# Patient Record
Sex: FEMALE | Race: WHITE | NOT HISPANIC OR LATINO | Employment: FULL TIME | URBAN - METROPOLITAN AREA
[De-identification: names, ages, dates, MRNs, and addresses within clinical notes are randomized per-mention and may not be internally consistent; named-entity substitution may affect disease eponyms.]

---

## 2022-01-26 ENCOUNTER — OFFICE VISIT (OUTPATIENT)
Dept: FAMILY MEDICINE | Facility: CLINIC | Age: 37
End: 2022-01-26
Payer: COMMERCIAL

## 2022-01-26 VITALS
OXYGEN SATURATION: 98 % | DIASTOLIC BLOOD PRESSURE: 77 MMHG | BODY MASS INDEX: 20.87 KG/M2 | WEIGHT: 145.8 LBS | HEART RATE: 88 BPM | TEMPERATURE: 98.9 F | SYSTOLIC BLOOD PRESSURE: 114 MMHG | HEIGHT: 70 IN

## 2022-01-26 DIAGNOSIS — J45.40 MODERATE PERSISTENT ASTHMA WITHOUT COMPLICATION: ICD-10-CM

## 2022-01-26 DIAGNOSIS — J30.89 NON-SEASONAL ALLERGIC RHINITIS, UNSPECIFIED TRIGGER: ICD-10-CM

## 2022-01-26 DIAGNOSIS — F33.0 MILD RECURRENT MAJOR DEPRESSION (H): Primary | ICD-10-CM

## 2022-01-26 DIAGNOSIS — F41.1 GAD (GENERALIZED ANXIETY DISORDER): ICD-10-CM

## 2022-01-26 DIAGNOSIS — B00.1 RECURRENT HERPES LABIALIS: ICD-10-CM

## 2022-01-26 PROCEDURE — 99203 OFFICE O/P NEW LOW 30 MIN: CPT | Performed by: FAMILY MEDICINE

## 2022-01-26 RX ORDER — ESCITALOPRAM OXALATE 10 MG/1
10 TABLET ORAL DAILY
Qty: 90 TABLET | Refills: 1 | Status: SHIPPED | OUTPATIENT
Start: 2022-01-26 | End: 2022-07-27

## 2022-01-26 RX ORDER — BUDESONIDE AND FORMOTEROL FUMARATE DIHYDRATE 160; 4.5 UG/1; UG/1
AEROSOL RESPIRATORY (INHALATION)
COMMUNITY
End: 2022-01-26

## 2022-01-26 RX ORDER — BUSPIRONE HYDROCHLORIDE 15 MG/1
15 TABLET ORAL 2 TIMES DAILY
Qty: 180 TABLET | Refills: 1 | Status: SHIPPED | OUTPATIENT
Start: 2022-01-26 | End: 2022-10-25

## 2022-01-26 RX ORDER — ESCITALOPRAM OXALATE 10 MG/1
10 TABLET ORAL DAILY
COMMUNITY
End: 2022-01-26

## 2022-01-26 RX ORDER — ACYCLOVIR 400 MG/1
400 TABLET ORAL 2 TIMES DAILY
Qty: 180 TABLET | Refills: 1 | Status: SHIPPED | OUTPATIENT
Start: 2022-01-26 | End: 2022-01-30

## 2022-01-26 RX ORDER — BUSPIRONE HYDROCHLORIDE 15 MG/1
15 TABLET ORAL 2 TIMES DAILY
COMMUNITY
End: 2022-01-26

## 2022-01-26 RX ORDER — ACYCLOVIR 400 MG/1
400 TABLET ORAL DAILY
COMMUNITY
End: 2022-01-26

## 2022-01-26 RX ORDER — BUDESONIDE AND FORMOTEROL FUMARATE DIHYDRATE 160; 4.5 UG/1; UG/1
AEROSOL RESPIRATORY (INHALATION)
Qty: 18 G | Refills: 1 | Status: SHIPPED | OUTPATIENT
Start: 2022-01-26 | End: 2022-11-14

## 2022-01-26 ASSESSMENT — ENCOUNTER SYMPTOMS
HEARTBURN: 0
CHILLS: 0
BREAST MASS: 0
CONSTIPATION: 0
HEADACHES: 0
FEVER: 0
WEAKNESS: 0
JOINT SWELLING: 0
PALPITATIONS: 0
EYE PAIN: 0
DIZZINESS: 0
MYALGIAS: 0
FREQUENCY: 0
HEMATOCHEZIA: 0
ABDOMINAL PAIN: 0
NERVOUS/ANXIOUS: 0
COUGH: 0
PARESTHESIAS: 0
DIARRHEA: 0
SORE THROAT: 0
NAUSEA: 0
ARTHRALGIAS: 0
HEMATURIA: 0
SHORTNESS OF BREATH: 0
DYSURIA: 0

## 2022-01-26 ASSESSMENT — PATIENT HEALTH QUESTIONNAIRE - PHQ9
5. POOR APPETITE OR OVEREATING: NOT AT ALL
SUM OF ALL RESPONSES TO PHQ QUESTIONS 1-9: 0

## 2022-01-26 ASSESSMENT — ANXIETY QUESTIONNAIRES
2. NOT BEING ABLE TO STOP OR CONTROL WORRYING: NOT AT ALL
IF YOU CHECKED OFF ANY PROBLEMS ON THIS QUESTIONNAIRE, HOW DIFFICULT HAVE THESE PROBLEMS MADE IT FOR YOU TO DO YOUR WORK, TAKE CARE OF THINGS AT HOME, OR GET ALONG WITH OTHER PEOPLE: NOT DIFFICULT AT ALL
3. WORRYING TOO MUCH ABOUT DIFFERENT THINGS: NOT AT ALL
1. FEELING NERVOUS, ANXIOUS, OR ON EDGE: NOT AT ALL
7. FEELING AFRAID AS IF SOMETHING AWFUL MIGHT HAPPEN: NOT AT ALL
5. BEING SO RESTLESS THAT IT IS HARD TO SIT STILL: NOT AT ALL
GAD7 TOTAL SCORE: 0
6. BECOMING EASILY ANNOYED OR IRRITABLE: NOT AT ALL

## 2022-01-26 ASSESSMENT — ASTHMA QUESTIONNAIRES: ACT_TOTALSCORE: 24

## 2022-01-26 ASSESSMENT — MIFFLIN-ST. JEOR: SCORE: 1435.97

## 2022-01-26 NOTE — PROGRESS NOTES
Assessment & Plan       ICD-10-CM    1. Mild recurrent major depression (H)  F33.0 escitalopram (LEXAPRO) 10 MG tablet   2. VEELIN (generalized anxiety disorder)  F41.1 busPIRone (BUSPAR) 15 MG tablet     escitalopram (LEXAPRO) 10 MG tablet   3. Recurrent herpes labialis  B00.1 acyclovir (ZOVIRAX) 400 MG tablet   4. Moderate persistent asthma without complication  J45.40 albuterol (PROAIR RESPICLICK) 108 (90 Base) MCG/ACT inhaler     budesonide-formoterol (SYMBICORT) 160-4.5 MCG/ACT Inhaler   5. Non-seasonal allergic rhinitis, unspecified trigger  J30.89      We discussed the above items  We will continue her same baseline meds  I advised a follow-up visit in about 6 months with an annual female exam including Pap smear    Return in about 6 months (around 7/26/2022) for Physical Exam, Routine Visit, follow up on depression.    Rolando Palacios MD  [facility] MARK Haney is a 36 year old who presents for the following health issues     Healthy Habits:     Getting at least 3 servings of Calcium per day:  Yes    Bi-annual eye exam:  Yes    Dental care twice a year:  Yes    Sleep apnea or symptoms of sleep apnea:  None    Diet:  Regular (no restrictions)    Frequency of exercise:  4-5 days/week    Duration of exercise:  30-45 minutes    Taking medications regularly:  Yes    Medication side effects:  None    PHQ-2 Total Score: 0    Additional concerns today:  No       Patient is here today to establish care.  Needs refills for her depression and anxiety, and asthma medications.    She just had a physical with her PCP in [place] in October, so we elected not to do a complete physical today.  Review of her electronic health record via [facility] shows that she last had a Pap smear on July 6 of 2017.  It was normal and HPV was negative, so I believe a repeat Pap was recommended in 5 years.  She is on medications as below.  She has been taking buspirone for years for anxiety.  She has  been on Lexapro for a year or so for depression and that medicine has been quite helpful.  She feels like her anxiety and depression are well controlled on these meds.  She takes Symbicort once or twice daily for asthma.  She does not need to use her albuterol inhaler much given her use of Symbicort.  She used to get recurrent cold sores that were quite bothersome, but she takes acyclovir 400 mg twice a day and that helps to prevent them.    She also has allergies and has been using cetirizine and Flonase nasal spray for them.    She generally feels well.  We asked her standard questions on anxiety and depression and she scored a 0 on those questionnaires.  She scored a 24 on her asthma control test.    Patient Active Problem List   Diagnosis     Mild recurrent major depression (H)     EVELIN (generalized anxiety disorder)     Recurrent herpes labialis     Moderate persistent asthma without complication     Non-seasonal allergic rhinitis, unspecified trigger     Current Outpatient Medications   Medication     acyclovir (ZOVIRAX) 400 MG tablet     albuterol (PROAIR RESPICLICK) 108 (90 Base) MCG/ACT inhaler     budesonide-formoterol (SYMBICORT) 160-4.5 MCG/ACT Inhaler     busPIRone (BUSPAR) 15 MG tablet     escitalopram (LEXAPRO) 10 MG tablet     No current facility-administered medications for this visit.         Review of Systems   Constitutional: Negative for chills and fever.   HENT: Positive for congestion. Negative for ear pain, hearing loss and sore throat.    Eyes: Negative for pain and visual disturbance.   Respiratory: Negative for cough and shortness of breath.    Cardiovascular: Negative for chest pain, palpitations and peripheral edema.   Gastrointestinal: Negative for abdominal pain, constipation, diarrhea, heartburn, hematochezia and nausea.   Breasts:  Negative for tenderness, breast mass and discharge.   Genitourinary: Negative for dysuria, frequency, genital sores, hematuria, pelvic pain, urgency,  "vaginal bleeding and vaginal discharge.   Musculoskeletal: Negative for arthralgias, joint swelling and myalgias.   Skin: Negative for rash.   Neurological: Negative for dizziness, weakness, headaches and paresthesias.   Psychiatric/Behavioral: Negative for mood changes. The patient is not nervous/anxious.             Objective    /77 (BP Location: Right arm, Patient Position: Sitting, Cuff Size: Adult Regular)   Pulse 88   Temp 98.9  F (37.2  C) (Oral)   Ht 1.785 m (5' 10.28\")   Wt 66.1 kg (145 lb 12.8 oz)   LMP 01/04/2022   SpO2 98%   Breastfeeding No   BMI 20.76 kg/m    Body mass index is 20.76 kg/m .  Physical Exam   GENERAL: healthy, alert and no distress  EYES: Eyes grossly normal to inspection, PERRL and conjunctivae and sclerae normal  RESP: lungs clear to auscultation - no rales, rhonchi or wheezes  CV: regular rate and rhythm, normal S1 S2, no S3 or S4, no murmur, click or rub, no peripheral edema and peripheral pulses strong  MS: no gross musculoskeletal defects noted, no edema  PSYCH: mentation appears normal, affect normal/bright.  She scored a 0 on both her PHQ-9 and EVELIN-7 as noted above.    Old records were reviewed via [facility].            "

## 2022-01-27 ASSESSMENT — ANXIETY QUESTIONNAIRES: GAD7 TOTAL SCORE: 0

## 2022-01-28 ENCOUNTER — TELEPHONE (OUTPATIENT)
Dept: FAMILY MEDICINE | Facility: CLINIC | Age: 37
End: 2022-01-28
Payer: COMMERCIAL

## 2022-01-28 DIAGNOSIS — B00.1 RECURRENT HERPES LABIALIS: ICD-10-CM

## 2022-01-30 RX ORDER — ACYCLOVIR 400 MG/1
400 TABLET ORAL 2 TIMES DAILY
Qty: 180 TABLET | Refills: 1 | Status: SHIPPED | OUTPATIENT
Start: 2022-01-30 | End: 2022-10-25

## 2022-01-30 NOTE — TELEPHONE ENCOUNTER
"Not sure how that got into the \"sig\". I sent a new Rx without it in there.    Rolando Palacios MD    "

## 2022-02-06 ENCOUNTER — HEALTH MAINTENANCE LETTER (OUTPATIENT)
Age: 37
End: 2022-02-06

## 2022-03-03 ENCOUNTER — OFFICE VISIT (OUTPATIENT)
Dept: URGENT CARE | Facility: URGENT CARE | Age: 37
End: 2022-03-03
Payer: COMMERCIAL

## 2022-03-03 ENCOUNTER — ANCILLARY PROCEDURE (OUTPATIENT)
Dept: GENERAL RADIOLOGY | Facility: CLINIC | Age: 37
End: 2022-03-03
Attending: NURSE PRACTITIONER
Payer: COMMERCIAL

## 2022-03-03 VITALS
HEIGHT: 71 IN | OXYGEN SATURATION: 99 % | DIASTOLIC BLOOD PRESSURE: 81 MMHG | SYSTOLIC BLOOD PRESSURE: 133 MMHG | TEMPERATURE: 97.9 F | BODY MASS INDEX: 20.66 KG/M2 | WEIGHT: 147.6 LBS | HEART RATE: 61 BPM

## 2022-03-03 DIAGNOSIS — S99.911A INJURY OF RIGHT ANKLE, INITIAL ENCOUNTER: ICD-10-CM

## 2022-03-03 DIAGNOSIS — S93.401A SPRAIN OF RIGHT ANKLE, UNSPECIFIED LIGAMENT, INITIAL ENCOUNTER: Primary | ICD-10-CM

## 2022-03-03 PROCEDURE — 99213 OFFICE O/P EST LOW 20 MIN: CPT | Performed by: NURSE PRACTITIONER

## 2022-03-03 PROCEDURE — 73610 X-RAY EXAM OF ANKLE: CPT | Mod: RT | Performed by: RADIOLOGY

## 2022-03-03 NOTE — PATIENT INSTRUCTIONS
Patient Education     Ankle Sprain (Adult)    An ankle sprain is a stretching or tearing of the ligaments that hold the ankle joint together. There are no broken bones.  An ankle sprain is a common injury for both children and adults. It happens when the ankle turns, twists, or rolls in an awkward way. This can be caused by a sports injury. Or it can happen from doing something as simple as stepping on an uneven surface.  Ligaments are made of tough connective tissue. Normally, ligaments stretch a certain amount and then go back to their normal place. A sprain happens when a ligament is forced to stretch more than the normal amount. A severe sprain can actually tear the ligaments. If you have a severe sprain, you may have felt or heard something like a pop when you were injured.  Most sprains take about 4 to 6 weeks to heal. A severe sprain can take several months to recover.  Your healthcare provider may order X-rays to be sure you don t have a fracture, or broken bone.  The injured area will feel sore. Swelling and pain may make it hard to walk. You may need crutches if walking is painful. Or your provider may have you use a cast boot or air splint. This will depend on the grade of ankle sprain that you have.  Home care    For a Grade 1 sprain, use RICE (rest, ice, compression, and elevation):    Rest your ankle. Don t walk on it.    Ice should be used right away to help control swelling. Place an ice pack over the injured area for 20 minutes. Do this every 3 to 6 hours for the first 24 to 48 hours. Keep using ice packs to ease pain and swelling as needed. To make an ice pack, put ice cubes in a plastic bag that seals at the top. Wrap the bag in a clean, thin towel or cloth. Never put ice or an ice pack directly on the skin. The ice pack can be put right on the cast, bandage, or splint. As the ice melts, be careful that the cast, bandage, or splint doesn t get wet. If you have a boot, open it to apply an ice  pack, unless told otherwise by your provider.    Compression devices help to control swelling. They also keep the ankle from moving and support your injured ankle. These devices include dressings, bandages, and wraps.    Elevate or raise your ankle above the level of your heart when sitting or lying down. This is very important for the first 48 hours.    Follow the RICE guidelines for a Grade 2 sprain. This type of sprain will take longer to heal. Your provider may have you wear a splint, cast, or brace to keep your ankle from moving.     If you have a Grade 3 sprain, you are at risk for long-term ankle instability. In rare cases, surgery may be needed. Your provider may have you wear a short leg cast or a walking boot for 2 to 3 weeks.    After 48 hours, it may be helpful to apply heat for 20 minutes several times a day. You can do this with a heating pad or warm compress. Or you may want to go back and forth between using ice and heat. Never apply heat directly to the skin. Always wrap the heating pad or warm compress in a clean, thin towel or cloth.    You may use over-the-counter pain medicine (NSAIDS or nonsteroidal anti-inflammatory drugs) to control pain, unless another pain medicine was prescribed. Talk with your provider before using these medicines if you have chronic liver or kidney disease, or have ever had a stomach ulcer or gastrointestinal bleeding.    Follow any rehabilitation exercises your provider gives you. These can help you be more flexible and improve your balance and coordination. This is helpful in preventing long-term ankle problems.  Prevention  To help prevent ankle sprains, it s important to have good strength, balance, and flexibility. Be sure to:    Always warm up before you exercise or do something very active    Be careful when walking or running on uneven or cracked surfaces    Wear shoes that are in good condition and fit well    Listen to your body s signals to slow down when you  are in pain or tired  Follow-up care  Any X-rays you had today don t show any broken bones, breaks, or fractures. Sometimes fractures don t show up on the first X-ray. Bruises and sprains can sometimes hurt as much as a fracture. These injuries can take time to heal completely. If your symptoms don t get better or they get worse, talk with your healthcare provider. You may need a repeat X-ray.  Follow up with your healthcare provider, or as advised. Check for any warning signs listed below.  When to seek medical advice  Call your healthcare provider right away if any of these occur:    Fever of 100.4 F (38 C) or higher, or as directed by your healthcare provider    Chills    The injury doesn t seem to be healing    The swelling comes back    The cast or splint has a bad smell    The plaster cast or splint gets wet or soft    The fiberglass cast or splint gets wet and does not dry for 24 hours    The pain or swelling increases, or redness appears    Your toes become cold, blue, numb, or tingly    The skin is discolored (looks blue, purple, or gray), has blisters, or is irritated    You re-injure your ankle  Keith last reviewed this educational content on 5/1/2018 2000-2021 [employer]. All rights reserved. This information is not intended as a substitute for professional medical care. Always follow your healthcare professional's instructions.

## 2022-03-03 NOTE — PROGRESS NOTES
Assessment & Plan     Sprain of right ankle, unspecified ligament, initial encounter    Injury of right ankle, initial encounter    - XR Ankle Right G/E 3 Views       Reviewed xray images and results during visit showing no obvious fracture or dislocation of right ankle. She declines tailbone imaging at this time. Discussed likely lateral ankle sprain and recommended RICE: rest, ice, compress, elevate. Ibuprofen 600 mg every 8 hours with food for the next 7-10 days recommended, tylenol as needed. Patient declines crutches and is fitted with ACE wrap. Gentle ROM once swelling decreases recommended. Discussed gradual healing expected over the next couple weeks and full healing in about 12 weeks.     Follow-up with PCP if symptoms persist for 7 days, and sooner if symptoms worsen or new symptoms develop.     Discussed red flag symptoms which warrant immediate visit in emergency room    All questions were answered and patient verbalized understanding. AVS reviewed with patient.     Shaniqua Mata, DNP, APRN, CNP 3/3/2022 12:22 PM  [facility] 36 [facility] who presents to clinic today for the following health issues:  Chief Complaint   Patient presents with     Musculoskeletal Problem     Ankle pain/bruise from fall last week.     Tailbone Pain     MS Injury/Pain    Onset of symptoms was 1 week(s) ago.  Location: right ankle and tailbone  Context:  The injury happened while falling landing on tailbone and rolled right ankle  Course of symptoms is worsening, moving into right lower leg.    Severity mild  Current and Associated symptoms: Pain, Swelling and Bruising  Denies  Warmth, Redness and Decreased range of motion  Aggravating Factors: weight-bearing at the end of the day has pain  Therapies to improve symptoms include: 400 mg ibuprofen at bedtime has been helping tempoarily  This is not the first time this type of problem has occurred for  "this patient, she has fractured and sprained her ankles.   Denies: fecal incontinence, urinary incontinence, lower extremity numbness, lower extremity weakness and paresthesia    She did not hit her head or lose consciousness    Problem list, Medication list, Allergies, and Medical history reviewed in EPIC.    ROS:  Review of systems negative except for noted above        Objective    /81 (BP Location: Left arm, Patient Position: Sitting, Cuff Size: Adult Regular)   Pulse 61   Temp 97.9  F (36.6  C) (Tympanic)   Ht 1.803 m (5' 11\")   Wt 67 kg (147 lb 9.6 oz)   SpO2 99%   BMI 20.59 kg/m    Physical Exam  Constitutional:       General: She is not in acute distress.     Appearance: She is not toxic-appearing or diaphoretic.   Cardiovascular:      Pulses: Normal pulses.   Musculoskeletal:      Right knee: Normal.      Right lower leg: Normal.      Right ankle: Swelling present. Tenderness present over the lateral malleolus. Normal range of motion.      Right foot: Normal.      Comments: No spinal or paraspinal tenderness with palpation, Full lumbar ROM. Mild right ankle swelling   Skin:     General: Skin is warm and dry.      Findings: Bruising present. No erythema.      Comments: Bruising right foot and lateral malleolus   Neurological:      Mental Status: She is alert.      Sensory: No sensory deficit.      Gait: Gait normal.      Comments: Able to walk on toes, heels, straight line walk          X-ray right ankle was performed and reviewed independently by myself showing no obvious fracture or dislocation  Radiologist impression:   Results for orders placed or performed in visit on 03/03/22   XR Ankle Right G/E 3 Views     Status: None    Narrative    EXAM: ANKLE RIGHT THREE OR MORE VIEWS   DATE/TIME: 3/3/2022 11:53 AM     INDICATION: Right ankle injury and pain.  COMPARISON: None.      Impression    IMPRESSION: Normal joint spacing and alignment. No fracture.    CONCEPCIÓN MARMOLEJO MD         SYSTEM ID:  " [id number]

## 2022-07-18 ASSESSMENT — PATIENT HEALTH QUESTIONNAIRE - PHQ9
10. IF YOU CHECKED OFF ANY PROBLEMS, HOW DIFFICULT HAVE THESE PROBLEMS MADE IT FOR YOU TO DO YOUR WORK, TAKE CARE OF THINGS AT HOME, OR GET ALONG WITH OTHER PEOPLE: NOT DIFFICULT AT ALL
SUM OF ALL RESPONSES TO PHQ QUESTIONS 1-9: 3
SUM OF ALL RESPONSES TO PHQ QUESTIONS 1-9: 3

## 2022-07-25 ENCOUNTER — OFFICE VISIT (OUTPATIENT)
Dept: FAMILY MEDICINE | Facility: CLINIC | Age: 37
End: 2022-07-25
Payer: COMMERCIAL

## 2022-07-25 VITALS
DIASTOLIC BLOOD PRESSURE: 68 MMHG | WEIGHT: 154.2 LBS | TEMPERATURE: 98.7 F | SYSTOLIC BLOOD PRESSURE: 130 MMHG | HEIGHT: 71 IN | OXYGEN SATURATION: 98 % | HEART RATE: 66 BPM | RESPIRATION RATE: 16 BRPM | BODY MASS INDEX: 21.59 KG/M2

## 2022-07-25 DIAGNOSIS — M23.8X2: Primary | ICD-10-CM

## 2022-07-25 DIAGNOSIS — J45.40 MODERATE PERSISTENT ASTHMA WITHOUT COMPLICATION: ICD-10-CM

## 2022-07-25 PROCEDURE — 99213 OFFICE O/P EST LOW 20 MIN: CPT | Performed by: FAMILY MEDICINE

## 2022-07-25 ASSESSMENT — PAIN SCALES - GENERAL: PAINLEVEL: NO PAIN (0)

## 2022-07-25 ASSESSMENT — PATIENT HEALTH QUESTIONNAIRE - PHQ9
SUM OF ALL RESPONSES TO PHQ QUESTIONS 1-9: 3
10. IF YOU CHECKED OFF ANY PROBLEMS, HOW DIFFICULT HAVE THESE PROBLEMS MADE IT FOR YOU TO DO YOUR WORK, TAKE CARE OF THINGS AT HOME, OR GET ALONG WITH OTHER PEOPLE: NOT DIFFICULT AT ALL

## 2022-07-25 ASSESSMENT — ASTHMA QUESTIONNAIRES: ACT_TOTALSCORE: 24

## 2022-07-25 NOTE — PROGRESS NOTES
Assessment & Plan     Intraligamentous cyst of knee, left   Appears to be cyst of ligament around the knee; since starting to bother her discussed evaluation by orthopedics  - Orthopedic  Referral; Future    Moderate persistent asthma without complication   ACT score good      No follow-ups on file.    Brooks Calderon MD  [facility] MARK Haney is a 36 year old, presenting for the following health issues:  Check Lump   Lump inner left knee x several years  Lately starting to bother her with exercise or even walking and appears to have gotten bigger;    History of Present Illness       Reason for visit:  Pain on left knew with lump that swells and grows with activity. Had the pain for several years but the lump size and extra pain is fairly new within the last several months.    She eats 4 or more servings of fruits and vegetables daily.She consumes 0 sweetened beverage(s) daily.She exercises with enough effort to increase her heart rate 30 to 60 minutes per day.  She exercises with enough effort to increase her heart rate 6 days per week.   She is taking medications regularly.    Today's PHQ-9         PHQ-9 Total Score: 3    PHQ-9 Q9 Thoughts of better off dead/self-harm past 2 weeks :   Not at all    How difficult have these problems made it for you to do your work, take care of things at home, or get along with other people: Not difficult at all     Asthma    Well controlled  ACT Total Scores 1/26/2022 7/25/2022   ACT TOTAL SCORE (Goal Greater than or Equal to 20) 24 24   In the past 12 months, how many times did you visit the emergency room for your asthma without being admitted to the hospital? 0 0   In the past 12 months, how many times were you hospitalized overnight because of your asthma? 0 0     Review of Systems   Constitutional, HEENT, cardiovascular, pulmonary, gi and gu systems are negative, except as otherwise noted.      Objective    /68 (BP  "Location: Left arm)   Pulse 66   Temp 98.7  F (37.1  C) (Oral)   Resp 16   Ht 1.81 m (5' 11.26\")   Wt 69.9 kg (154 lb 3.2 oz)   LMP 07/14/2022 (Approximate)   SpO2 98%   BMI 21.35 kg/m    Body mass index is 21.35 kg/m .  Physical Exam   GENERAL: healthy, alert and no distress  RESP: lungs clear to auscultation - no rales, rhonchi or wheezes  CV: regular rate and rhythm, no murmur, click or rub, no peripheral edema   MS: Lt Knee         Non tender cystic lesion below joint line     "

## 2022-07-26 DIAGNOSIS — F41.1 GAD (GENERALIZED ANXIETY DISORDER): ICD-10-CM

## 2022-07-26 DIAGNOSIS — F33.0 MILD RECURRENT MAJOR DEPRESSION (H): ICD-10-CM

## 2022-07-27 RX ORDER — ESCITALOPRAM OXALATE 10 MG/1
10 TABLET ORAL DAILY
Qty: 90 TABLET | Refills: 1 | Status: SHIPPED | OUTPATIENT
Start: 2022-07-27 | End: 2023-01-20

## 2022-07-27 NOTE — TELEPHONE ENCOUNTER
Prescription approved per [facility] Refill Protocol.  Juliette Denny RN    PHQ-9 score:    PHQ 7/26/2022   PHQ-9 Total Score 0   Q9: Thoughts of better off dead/self-harm past 2 weeks Not at all

## 2022-08-03 ENCOUNTER — OFFICE VISIT (OUTPATIENT)
Dept: ORTHOPEDICS | Facility: CLINIC | Age: 37
End: 2022-08-03

## 2022-08-03 ENCOUNTER — ANCILLARY PROCEDURE (OUTPATIENT)
Dept: GENERAL RADIOLOGY | Facility: CLINIC | Age: 37
End: 2022-08-03
Attending: ORTHOPAEDIC SURGERY
Payer: COMMERCIAL

## 2022-08-03 VITALS
SYSTOLIC BLOOD PRESSURE: 116 MMHG | HEIGHT: 71 IN | HEART RATE: 76 BPM | BODY MASS INDEX: 21.14 KG/M2 | WEIGHT: 151 LBS | DIASTOLIC BLOOD PRESSURE: 74 MMHG

## 2022-08-03 DIAGNOSIS — M25.562 CHRONIC PAIN OF LEFT KNEE: ICD-10-CM

## 2022-08-03 DIAGNOSIS — G89.29 CHRONIC PAIN OF LEFT KNEE: ICD-10-CM

## 2022-08-03 DIAGNOSIS — M23.007 PERIMENISCAL CYST OF LEFT KNEE: Primary | ICD-10-CM

## 2022-08-03 PROCEDURE — 20610 DRAIN/INJ JOINT/BURSA W/O US: CPT | Mod: LT | Performed by: ORTHOPAEDIC SURGERY

## 2022-08-03 PROCEDURE — 99244 OFF/OP CNSLTJ NEW/EST MOD 40: CPT | Mod: 25 | Performed by: ORTHOPAEDIC SURGERY

## 2022-08-03 PROCEDURE — 73562 X-RAY EXAM OF KNEE 3: CPT | Mod: TC | Performed by: RADIOLOGY

## 2022-08-03 RX ORDER — TRIAMCINOLONE ACETONIDE 40 MG/ML
40 INJECTION, SUSPENSION INTRA-ARTICULAR; INTRAMUSCULAR
Status: SHIPPED | OUTPATIENT
Start: 2022-08-03

## 2022-08-03 RX ORDER — BUPIVACAINE HYDROCHLORIDE 2.5 MG/ML
3 INJECTION, SOLUTION EPIDURAL; INFILTRATION; INTRACAUDAL
Status: SHIPPED | OUTPATIENT
Start: 2022-08-03

## 2022-08-03 RX ADMIN — TRIAMCINOLONE ACETONIDE 40 MG: 40 INJECTION, SUSPENSION INTRA-ARTICULAR; INTRAMUSCULAR at 10:36

## 2022-08-03 RX ADMIN — BUPIVACAINE HYDROCHLORIDE 3 ML: 2.5 INJECTION, SOLUTION EPIDURAL; INFILTRATION; INTRACAUDAL at 10:36

## 2022-08-03 ASSESSMENT — PAIN SCALES - GENERAL: PAINLEVEL: NO PAIN (1)

## 2022-08-03 NOTE — PROGRESS NOTES
"CHIEF COMPLAINT:   Chief Complaint   Patient presents with     Left Knee - Pain     Onset: about 6 months. NKI. Patient notes she has always had knee pain since early 20's. She noticed a lump about 6 months ago when she was comparing sides of the knee. Lump has gotten bigger. Pain with pressure, running, walking, jumping. She is very active. Hx of ballet when she was in high school to mid 20's. Pain comes and goes. No tx.    .  Medina Carvajal is seen today in the [facility] for evaluation of left knee mass at the request of Dr. Brooks Calderon         HISTORY OF PRESENT ILLNESS    Medina Carvajal is a 36 year old female seen for evaluation of ongoing left knee pain with a mass with no known injury.   Pain, mass has been present for 6 months. She reports always having knee pain since her early 20's. She noticed the lump about 6 months ago when she was comparing her knees. She feels the lump has increased in size since first noticed. Pain with pressure to the area, running, walking, jumping. She is very active so its bothering her. Pain comes and goes. Lump along the inner aspect, pain in area but also on top of the knee cap. Has decreased activities recently so pain has improved.    She states the knee feels \"bigger\" when there is pain.    Treatment with stopping running or walking long distances. Occasional ibuprofen.    History of ballet in high school and in her 20's.    Present symptoms: pain medially  and anteriorly, pain sharp, dull/achy , mild pain, no catching/popping, no locking, no giving way.    Pain severity: 1/10  Frequency of symptoms: episodic  Exacerbating Factors: running, jumping, prolonged walking  Relieving Factors: rest, sitting  Night Pain: occasional if knees touch each other.      Other PMH:  has no past medical history on file.  Patient Active Problem List   Diagnosis     Mild recurrent major depression (H)     EVELIN (generalized anxiety disorder)     " "Recurrent herpes labialis     Moderate persistent asthma without complication     Non-seasonal allergic rhinitis, unspecified trigger       Surgical Hx:  has no past surgical history on file.    Medications:   Current Outpatient Medications:      acyclovir (ZOVIRAX) 400 MG tablet, Take 1 tablet (400 mg) by mouth 2 times daily, Disp: 180 tablet, Rfl: 1     albuterol (PROAIR RESPICLICK) 108 (90 Base) MCG/ACT inhaler, Inhale 1-2 puffs into the lungs every 6 hours as needed for shortness of breath / dyspnea or wheezing As PRN, Disp: 1 each, Rfl: 2     budesonide-formoterol (SYMBICORT) 160-4.5 MCG/ACT Inhaler, Inhale 1-2 puff(s) twice a day by inhalation route., Disp: 18 g, Rfl: 1     busPIRone (BUSPAR) 15 MG tablet, Take 1 tablet (15 mg) by mouth 2 times daily, Disp: 180 tablet, Rfl: 1     escitalopram (LEXAPRO) 10 MG tablet, TAKE 1 TABLET (10 MG) BY MOUTH DAILY., Disp: 90 tablet, Rfl: 1    Allergies: No Known Allergies    Social Hx: .   reports that she has never smoked. She has never used smokeless tobacco. She reports previous alcohol use. She reports that she does not use drugs.    Family Hx: family history is not on file.    REVIEW OF SYSTEMS: 10 point ROS neg other than the symptoms noted above in the HPI and PMH. Notables include  CONSTITUTIONAL:NEGATIVE for fever, chills, change in weight  INTEGUMENTARY/SKIN: NEGATIVE for worrisome rashes, moles or lesions  MUSCULOSKELETAL:See HPI above  NEURO: NEGATIVE for weakness, dizziness or paresthesias    PHYSICAL EXAM:  /74   Pulse 76   Ht 1.81 m (5' 11.26\")   Wt 68.5 kg (151 lb)   LMP 07/14/2022 (Approximate)   BMI 20.91 kg/m     GENERAL APPEARANCE: healthy, alert, no distress  SKIN: no suspicious lesions or rashes  NEURO: Normal strength and tone, mentation intact and speech normal  PSYCH:  mentation appears normal and affect normal, not anxious  RESPIRATORY: No increased work of breathing.  HANDS: no clubbing, nail pitting  LYMPH: no " palpable popliteal lymphadenopathy.    BILATERAL LOWER EXTREMITIES:  Gait: normal  Alignment: neutral  No gross deformities or masses.  No Quad atrophy, strength normal.  Intact sensation deep peroneal nerve, superficial peroneal nerve, med/lat tibial nerve, sural nerve, saphenous nerve  Intact EHL, EDL, TA, FHL, GS, quadriceps hamstrings and hip flexors  Toes warm and well perfused, brisk capillary refill. Palpable 2+ dp pulses.  Bilateral calf soft and nttp or squeeze.  DTRs: achilles 2+, patella 2+.  Edema: none    LEFT KNEE EXAM:    Skin: intact, no ecchymosis or erythema  ~1-1.5cm soft tissue mass medial knee, just inferior to the medial joint line but proximal to the pes insertion. This is slight tender to palpation, firm.  mass transilluminates with light.    Squat: 100 %, not limited by pain.     ROM: full extension to 140 flexion, some anterior discomfort with flexion.  Tight hamstrings on straight leg raise.  Effusion: none  Tender: medial joint line, mass  NTTP lat joint line, anterior or posterior knee  McMurrays: equivocal medially for pain    MCL: stable, and non-painful at both 0 and 30 degrees knee flexion  Varus stress: stable, and non-painful at both 0 and 30 degrees knee flexion  Lachmans: neg, firm endpoint  Posterior Drawer stable  Patellofemoral joint:                Apprehension: negative              Crepitations: minimal   Grind: positive.    RIGHT KNEE EXAM:    Skin: intact, no ecchymosis or erythema  Squat: 100 %, not limited by pain.     ROM: full extension to 140 flexion  Tight hamstrings on straight leg raise.  Effusion: none  Tender: NTTP med/lat joint line, anterior or posterior knee  McMurrays: negative    MCL: stable, and non-painful at both 0 and 30 degrees knee flexion  Varus stress: stable, and non-painful at both 0 and 30 degrees knee flexion  Lachmans: neg, firm endpoint  Posterior Drawer stable  Patellofemoral joint:                Apprehension:  "negative              Crepitations: minimal    X-RAY:  3 views left knee from 8/3/2022 were reviewed in clinic today. On my review, no obvious fractures or dislocations. Preserved joint spaces.         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 See procedure note below. Appears the cyst decompressed with aspiration attempt although no formal fluid was aspirated.  * rest ice/heat, over the counter pain control, activity modification, compression   * return to clinic as needed.  * consider MRI in future as needed.      * All questions were addressed and answered prior to discharge from clinic today. The patient acknowledges an understanding of and agreement with the plan set forth during today's visit. Patient was advised to call our office or MyChart us if any further questions arise upon leaving our office today.      Usman Briceno M.D., M.S.  Dept. of Orthopaedic Surgery  [facility]    Large Joint Injection/Arthocentesis    Date/Time: 8/3/2022 10:36 AM  Performed by: Usman Briceno MD  Authorized by: Usman Briceno MD     Indications:  Pain  Needle Size:  22 G  Guidance: landmark guided    Approach:  Anteromedial  Location:  Knee   Location comment:  Left knee - Medial knee cyst      Medications:  40 mg triamcinolone 40 MG/ML; 3 mL bupivacaine HCl (PF) " 0.25 %  Aspirate amount (mL):  0  Outcome:  Tolerated well, no immediate complications  Procedure discussed: discussed risks, benefits, and alternatives    Consent Given by:  Patient  Timeout: timeout called immediately prior to procedure    Prep: patient was prepped and draped in usual sterile fashion

## 2022-08-03 NOTE — LETTER
"    8/3/2022         RE: Medina Carvajal  [address]  [place] [address]        Dear Colleague,    Thank you for referring your patient, Medina Carvajal, to the [facility]. Please see a copy of my visit note below.    CHIEF COMPLAINT:   Chief Complaint   Patient presents with     Left Knee - Pain     Onset: about 6 months. NKI. Patient notes she has always had knee pain since early 20's. She noticed a lump about 6 months ago when she was comparing sides of the knee. Lump has gotten bigger. Pain with pressure, running, walking, jumping. She is very active. Hx of ballet when she was in high school to mid 20's. Pain comes and goes. No tx.    .  Medina Carvajal is seen today in the [facility] for evaluation of left knee mass at the request of Dr. Brooks Calderon         HISTORY OF PRESENT ILLNESS    Medina Carvajal is a 36 year old female seen for evaluation of ongoing left knee pain with a mass with no known injury.   Pain, mass has been present for 6 months. She reports always having knee pain since her early 20's. She noticed the lump about 6 months ago when she was comparing her knees. She feels the lump has increased in size since first noticed. Pain with pressure to the area, running, walking, jumping. She is very active so its bothering her. Pain comes and goes. Lump along the inner aspect, pain in area but also on top of the knee cap. Has decreased activities recently so pain has improved.    She states the knee feels \"bigger\" when there is pain.    Treatment with stopping running or walking long distances. Occasional ibuprofen.    History of ballet in high school and in her 20's.    Present symptoms: pain medially  and anteriorly, pain sharp, dull/achy , mild pain, no catching/popping, no locking, no giving way.    Pain severity: 1/10  Frequency of symptoms: episodic  Exacerbating Factors: running, jumping, prolonged " "walking  Relieving Factors: rest, sitting  Night Pain: occasional if knees touch each other.      Other PMH:  has no past medical history on file.  Patient Active Problem List   Diagnosis     Mild recurrent major depression (H)     EVELIN (generalized anxiety disorder)     Recurrent herpes labialis     Moderate persistent asthma without complication     Non-seasonal allergic rhinitis, unspecified trigger       Surgical Hx:  has no past surgical history on file.    Medications:   Current Outpatient Medications:      acyclovir (ZOVIRAX) 400 MG tablet, Take 1 tablet (400 mg) by mouth 2 times daily, Disp: 180 tablet, Rfl: 1     albuterol (PROAIR RESPICLICK) 108 (90 Base) MCG/ACT inhaler, Inhale 1-2 puffs into the lungs every 6 hours as needed for shortness of breath / dyspnea or wheezing As PRN, Disp: 1 each, Rfl: 2     budesonide-formoterol (SYMBICORT) 160-4.5 MCG/ACT Inhaler, Inhale 1-2 puff(s) twice a day by inhalation route., Disp: 18 g, Rfl: 1     busPIRone (BUSPAR) 15 MG tablet, Take 1 tablet (15 mg) by mouth 2 times daily, Disp: 180 tablet, Rfl: 1     escitalopram (LEXAPRO) 10 MG tablet, TAKE 1 TABLET (10 MG) BY MOUTH DAILY., Disp: 90 tablet, Rfl: 1    Allergies: No Known Allergies    Social Hx: .   reports that she has never smoked. She has never used smokeless tobacco. She reports previous alcohol use. She reports that she does not use drugs.    Family Hx: family history is not on file.    REVIEW OF SYSTEMS: 10 point ROS neg other than the symptoms noted above in the HPI and PMH. Notables include  CONSTITUTIONAL:NEGATIVE for fever, chills, change in weight  INTEGUMENTARY/SKIN: NEGATIVE for worrisome rashes, moles or lesions  MUSCULOSKELETAL:See HPI above  NEURO: NEGATIVE for weakness, dizziness or paresthesias    PHYSICAL EXAM:  /74   Pulse 76   Ht 1.81 m (5' 11.26\")   Wt 68.5 kg (151 lb)   LMP 07/14/2022 (Approximate)   BMI 20.91 kg/m     GENERAL APPEARANCE: healthy, alert, no " distress  SKIN: no suspicious lesions or rashes  NEURO: Normal strength and tone, mentation intact and speech normal  PSYCH:  mentation appears normal and affect normal, not anxious  RESPIRATORY: No increased work of breathing.  HANDS: no clubbing, nail pitting  LYMPH: no palpable popliteal lymphadenopathy.    BILATERAL LOWER EXTREMITIES:  Gait: normal  Alignment: neutral  No gross deformities or masses.  No Quad atrophy, strength normal.  Intact sensation deep peroneal nerve, superficial peroneal nerve, med/lat tibial nerve, sural nerve, saphenous nerve  Intact EHL, EDL, TA, FHL, GS, quadriceps hamstrings and hip flexors  Toes warm and well perfused, brisk capillary refill. Palpable 2+ dp pulses.  Bilateral calf soft and nttp or squeeze.  DTRs: achilles 2+, patella 2+.  Edema: none    LEFT KNEE EXAM:    Skin: intact, no ecchymosis or erythema  ~1-1.5cm soft tissue mass medial knee, just inferior to the medial joint line but proximal to the pes insertion. This is slight tender to palpation, firm.  mass transilluminates with light.    Squat: 100 %, not limited by pain.     ROM: full extension to 140 flexion, some anterior discomfort with flexion.  Tight hamstrings on straight leg raise.  Effusion: none  Tender: medial joint line, mass  NTTP lat joint line, anterior or posterior knee  McMurrays: equivocal medially for pain    MCL: stable, and non-painful at both 0 and 30 degrees knee flexion  Varus stress: stable, and non-painful at both 0 and 30 degrees knee flexion  Lachmans: neg, firm endpoint  Posterior Drawer stable  Patellofemoral joint:                Apprehension: negative              Crepitations: minimal   Grind: positive.    RIGHT KNEE EXAM:    Skin: intact, no ecchymosis or erythema  Squat: 100 %, not limited by pain.     ROM: full extension to 140 flexion  Tight hamstrings on straight leg raise.  Effusion: none  Tender: NTTP med/lat joint line, anterior or posterior knee  McMurrays: negative    MCL:  "stable, and non-painful at both 0 and 30 degrees knee flexion  Varus stress: stable, and non-painful at both 0 and 30 degrees knee flexion  Lachmans: neg, firm endpoint  Posterior Drawer stable  Patellofemoral joint:                Apprehension: negative              Crepitations: minimal    X-RAY:  3 views left knee from 8/3/2022 were reviewed in clinic today. On my review, no obvious fractures or dislocations. Preserved joint spaces.         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 See procedure note below. Appears the cyst decompressed with aspiration attempt although no formal fluid was aspirated.  * rest ice/heat, over the counter pain control, activity modification, compression   * return to clinic as needed.  * consider MRI in future as needed.      * All questions were addressed and answered prior to discharge from clinic today. The patient acknowledges an understanding of and agreement with the plan set forth during today's visit. Patient was advised to call our office or MyChart us if any further questions arise upon leaving our office today.      Usman Briceno M.D., M.S.  Dept. of Orthopaedic Surgery  [facility]    Large Joint Injection/Arthocentesis    Date/Time: 8/3/2022 10:36 AM  Performed by: Usman Briceno MD  Authorized by: Usman Briceno " MD Robe     Indications:  Pain  Needle Size:  22 G  Guidance: landmark guided    Approach:  Anteromedial  Location:  Knee   Location comment:  Left knee - Medial knee cyst      Medications:  40 mg triamcinolone 40 MG/ML; 3 mL bupivacaine HCl (PF) 0.25 %  Aspirate amount (mL):  0  Outcome:  Tolerated well, no immediate complications  Procedure discussed: discussed risks, benefits, and alternatives    Consent Given by:  Patient  Timeout: timeout called immediately prior to procedure    Prep: patient was prepped and draped in usual sterile fashion                Again, thank you for allowing me to participate in the care of your patient.        Sincerely,        Usman Briceno MD

## 2022-10-25 DIAGNOSIS — B00.1 RECURRENT HERPES LABIALIS: ICD-10-CM

## 2022-10-25 DIAGNOSIS — F41.1 GAD (GENERALIZED ANXIETY DISORDER): ICD-10-CM

## 2022-10-25 RX ORDER — BUSPIRONE HYDROCHLORIDE 15 MG/1
TABLET ORAL
Qty: 180 TABLET | Refills: 0 | Status: SHIPPED | OUTPATIENT
Start: 2022-10-25 | End: 2023-02-20

## 2022-10-25 RX ORDER — ACYCLOVIR 400 MG/1
TABLET ORAL
Qty: 180 TABLET | Refills: 0 | Status: SHIPPED | OUTPATIENT
Start: 2022-10-25 | End: 2023-01-23

## 2022-11-09 DIAGNOSIS — F41.1 GAD (GENERALIZED ANXIETY DISORDER): ICD-10-CM

## 2022-11-09 DIAGNOSIS — J45.40 MODERATE PERSISTENT ASTHMA WITHOUT COMPLICATION: ICD-10-CM

## 2022-11-09 DIAGNOSIS — F33.0 MILD RECURRENT MAJOR DEPRESSION (H): ICD-10-CM

## 2022-11-09 RX ORDER — ESCITALOPRAM OXALATE 10 MG/1
10 TABLET ORAL DAILY
Qty: 90 TABLET | Refills: 1 | OUTPATIENT
Start: 2022-11-09

## 2022-11-09 NOTE — TELEPHONE ENCOUNTER
Reason for Call:  Other prescription and call back    Detailed comments:Patient tried to refill budesonide-formoterol (SYMBICORT) 160-4.5 MCG/ACT Inhaler, but patient stated that pharmacy told her that it was refused by provider. Patient states that on her MyChart said that she should still have one more refill.  Please call patient to clarify.     Phone Number Patient can be reached at: Cell number on file:    Telephone Information:   Mobile [phone]       Best Time: Any    Can we leave a detailed message on this number? YES    Call taken on 11/9/2022 at 4:09 PM by Antonette Valladares

## 2022-11-14 RX ORDER — BUDESONIDE AND FORMOTEROL FUMARATE DIHYDRATE 160; 4.5 UG/1; UG/1
AEROSOL RESPIRATORY (INHALATION)
Qty: 18 G | Refills: 1 | Status: SHIPPED | OUTPATIENT
Start: 2022-11-14 | End: 2023-05-03

## 2023-01-20 DIAGNOSIS — F33.0 MILD RECURRENT MAJOR DEPRESSION (H): ICD-10-CM

## 2023-01-20 DIAGNOSIS — F41.1 GAD (GENERALIZED ANXIETY DISORDER): ICD-10-CM

## 2023-01-20 RX ORDER — ESCITALOPRAM OXALATE 10 MG/1
10 TABLET ORAL DAILY
Qty: 90 TABLET | Refills: 1 | Status: SHIPPED | OUTPATIENT
Start: 2023-01-20 | End: 2023-05-16

## 2023-01-21 DIAGNOSIS — B00.1 RECURRENT HERPES LABIALIS: ICD-10-CM

## 2023-01-23 RX ORDER — ACYCLOVIR 400 MG/1
TABLET ORAL
Qty: 180 TABLET | Refills: 0 | Status: SHIPPED | OUTPATIENT
Start: 2023-01-23 | End: 2023-04-21

## 2023-02-11 ENCOUNTER — HEALTH MAINTENANCE LETTER (OUTPATIENT)
Age: 38
End: 2023-02-11

## 2023-02-20 DIAGNOSIS — F41.1 GAD (GENERALIZED ANXIETY DISORDER): ICD-10-CM

## 2023-02-20 RX ORDER — BUSPIRONE HYDROCHLORIDE 15 MG/1
TABLET ORAL
Qty: 180 TABLET | Refills: 0 | Status: SHIPPED | OUTPATIENT
Start: 2023-02-20 | End: 2023-05-16

## 2023-03-08 ENCOUNTER — ANCILLARY PROCEDURE (OUTPATIENT)
Dept: GENERAL RADIOLOGY | Facility: CLINIC | Age: 38
End: 2023-03-08
Attending: PHYSICIAN ASSISTANT
Payer: COMMERCIAL

## 2023-03-08 ENCOUNTER — OFFICE VISIT (OUTPATIENT)
Dept: URGENT CARE | Facility: URGENT CARE | Age: 38
End: 2023-03-08
Payer: COMMERCIAL

## 2023-03-08 ENCOUNTER — OFFICE VISIT (OUTPATIENT)
Dept: PEDIATRICS | Facility: CLINIC | Age: 38
End: 2023-03-08
Payer: COMMERCIAL

## 2023-03-08 VITALS
DIASTOLIC BLOOD PRESSURE: 77 MMHG | TEMPERATURE: 97.1 F | HEART RATE: 66 BPM | OXYGEN SATURATION: 100 % | SYSTOLIC BLOOD PRESSURE: 119 MMHG | BODY MASS INDEX: 20.57 KG/M2 | WEIGHT: 148.6 LBS

## 2023-03-08 VITALS
HEIGHT: 71 IN | WEIGHT: 148.59 LBS | OXYGEN SATURATION: 99 % | BODY MASS INDEX: 20.8 KG/M2 | RESPIRATION RATE: 14 BRPM | HEART RATE: 72 BPM | DIASTOLIC BLOOD PRESSURE: 70 MMHG | TEMPERATURE: 96.8 F | SYSTOLIC BLOOD PRESSURE: 105 MMHG

## 2023-03-08 DIAGNOSIS — M94.0 COSTOCHONDRITIS: ICD-10-CM

## 2023-03-08 DIAGNOSIS — R07.81 PLEURITIC CHEST PAIN: Primary | ICD-10-CM

## 2023-03-08 DIAGNOSIS — U07.1 INFECTION DUE TO 2019 NOVEL CORONAVIRUS: ICD-10-CM

## 2023-03-08 DIAGNOSIS — R07.9 CHEST PAIN, UNSPECIFIED TYPE: ICD-10-CM

## 2023-03-08 DIAGNOSIS — R05.1 ACUTE COUGH: ICD-10-CM

## 2023-03-08 DIAGNOSIS — R07.9 CHEST PAIN, UNSPECIFIED TYPE: Primary | ICD-10-CM

## 2023-03-08 LAB
ANION GAP SERPL CALCULATED.3IONS-SCNC: 5 MMOL/L (ref 3–14)
BUN SERPL-MCNC: 11 MG/DL (ref 7–30)
CALCIUM SERPL-MCNC: 9.6 MG/DL (ref 8.5–10.1)
CHLORIDE BLD-SCNC: 107 MMOL/L (ref 94–109)
CO2 SERPL-SCNC: 26 MMOL/L (ref 20–32)
CREAT SERPL-MCNC: 0.78 MG/DL (ref 0.52–1.04)
D DIMER PPP FEU-MCNC: 0.42 UG/ML FEU (ref 0–0.5)
GFR SERPL CREATININE-BSD FRML MDRD: >90 ML/MIN/1.73M2
GLUCOSE BLD-MCNC: 82 MG/DL (ref 70–99)
POTASSIUM BLD-SCNC: 4.3 MMOL/L (ref 3.4–5.3)
SODIUM SERPL-SCNC: 138 MMOL/L (ref 133–144)

## 2023-03-08 PROCEDURE — 80048 BASIC METABOLIC PNL TOTAL CA: CPT | Performed by: EMERGENCY MEDICINE

## 2023-03-08 PROCEDURE — 36415 COLL VENOUS BLD VENIPUNCTURE: CPT | Performed by: EMERGENCY MEDICINE

## 2023-03-08 PROCEDURE — 85379 FIBRIN DEGRADATION QUANT: CPT | Performed by: EMERGENCY MEDICINE

## 2023-03-08 PROCEDURE — 99215 OFFICE O/P EST HI 40 MIN: CPT | Mod: CS | Performed by: EMERGENCY MEDICINE

## 2023-03-08 PROCEDURE — 99207 REFERRAL TO ACUTE AND DIAGNOSTIC SERVICES: CPT | Performed by: PHYSICIAN ASSISTANT

## 2023-03-08 PROCEDURE — 93000 ELECTROCARDIOGRAM COMPLETE: CPT | Performed by: EMERGENCY MEDICINE

## 2023-03-08 PROCEDURE — 71046 X-RAY EXAM CHEST 2 VIEWS: CPT | Mod: TC | Performed by: RADIOLOGY

## 2023-03-08 RX ORDER — CYCLOBENZAPRINE HCL 10 MG
5 TABLET ORAL 2 TIMES DAILY PRN
Qty: 20 TABLET | Refills: 0 | Status: SHIPPED | OUTPATIENT
Start: 2023-03-08 | End: 2023-10-26

## 2023-03-08 RX ORDER — BENZONATATE 100 MG/1
100 CAPSULE ORAL 2 TIMES DAILY PRN
Qty: 20 CAPSULE | Refills: 0 | Status: SHIPPED | OUTPATIENT
Start: 2023-03-08 | End: 2023-10-26

## 2023-03-08 ASSESSMENT — ENCOUNTER SYMPTOMS
HEADACHES: 0
VOMITING: 0
ARTHRALGIAS: 0
BACK PAIN: 0
COUGH: 0
ENDOCRINE NEGATIVE: 1
ALLERGIC/IMMUNOLOGIC NEGATIVE: 1
CHILLS: 0
RHINORRHEA: 0
PALPITATIONS: 0
MUSCULOSKELETAL NEGATIVE: 1
WOUND: 0
NECK PAIN: 0
EYES NEGATIVE: 1
JOINT SWELLING: 0
DIZZINESS: 0
SHORTNESS OF BREATH: 0
LIGHT-HEADEDNESS: 0
FEVER: 0
NAUSEA: 0
MYALGIAS: 0
RESPIRATORY NEGATIVE: 1
DIARRHEA: 0
WEAKNESS: 0
NECK STIFFNESS: 0
SORE THROAT: 0

## 2023-03-08 ASSESSMENT — PAIN SCALES - GENERAL: PAINLEVEL: SEVERE PAIN (7)

## 2023-03-08 NOTE — PROGRESS NOTES
Assessment & Plan     Diagnosis:  (R07.81) Pleuritic chest pain  (primary encounter diagnosis)  Plan: cyclobenzaprine (FLEXERIL) 10 MG tablet    (U07.1) Infection due to 2019 novel coronavirus    (R05.1) Acute cough  Plan: benzonatate (TESSALON) 100 MG capsule    (M94.0) Costochondritis      Medical Decision Making:  Medina Carvajal is a 37 year old female who presents for evaluation of chest pain.  Vital signs on arrival are within normal limits; oxygen is 99% on room air. I considered a broad differential diagnosis for the patient's chest pain including life threatening etiologies such as acute coronary syndrome, myocardial infarction, pulmonary embolism, acute aortic dissection, myocarditis, pericarditis, acute valvular insufficiency amongst others.  Other causes considered included pneumonia, pneumothorax, chest wall source, pericarditis, pleurisy, esophageal spasm.    This seems likely related to pleuritis given the positional character and pain that increases with increased deep breath as well as response to NSAIDS. She also has some point tenderness on the left costosternal margin consistent with costochondritis.     By the PERC rules patient would not require d-dimer. However, patient did have COVID-19 infection with symptoms starting 2/17/2023 (has improved but over the past week has persistent cough) and given uncertainty of increased coagulopathies with COVID-19  I had shared decision making with patient and ultimately d-dimer was performed. Fortunately, the d-dimer is negative and given low concern for PE would not test further for this. EKG is also unremarkable without signs of ACS, or pericarditis.  Chest x-ray at urgent care this morning is negative for any signs of acute fracture, effusions or pneumothorax per radiology. BMP non-contributory here.      No serious etiology for the chest pain were detected today during this visit.  Close follow up with primary care is indicated should the pain  continue, as further work up may be performed; this was made clear to the patient, who understands.  Patient will try NSAIDs, Flexeril and Tessalon Perles as prescribed for symptomatic management.     Patient voices understanding and agreement with the plan including reasons to go to the ER immediately as well as to be seen by a more consistent care-giver, such as their PCP, if the symptoms persist more than 2 days.     40 minutes spent on the date of the encounter doing chart review, review of outside records, review of test results, interpretation of tests, patient visit and documentation       [facility]    Subjective     Medina Carvajal is a 37 year old female who presents to clinic today for the following health issues:  Chief Complaint   Patient presents with     Chest Pain       HPI    HPI     Chest Pain  Onset/Duration: Last night 3/7/2023  Description:   Location: middle   Character: sharp, achey and tight  Radiation: on left side  Duration: 2 days, constant and waxing and waning   Intensity: moderate, severe  Progression of Symptoms: worsening and waxing and waning  Accompanying Signs & Symptoms:  Shortness of breath: No  Sweating: No  Nausea/vomiting: No  Lightheadedness: No  Palpitations: No  Fever/Chills: No  Cough: YES           Heartburn: No  History:   Family history of heart disease: YES  Tobacco use: No  Previous similar symptoms: no   Precipitating factors:   Worse with exertion: YES  Worse with deep breaths: YES           Related to eating: No           Better with burping: No  Alleviating factors: mild  Therapies tried and outcome: mild    Patient states that she tested positive for COVID around 2/26/2023 and has been having symptoms since 2/17/2023.  She notes she had 2 days of fevers, body aches and general malaise, this all improved she had no cough until about 1 week ago when she started having a hacking cough.  She notes that this is gradually been  "increasing in intensity and is concerned she is cracked rib that she has in the past after hacking cough.  She denies any current fevers, shortness of breath, leg pain or swelling, history of DVT/PE, lightheadedness, dizziness, palpitations or other concerns.        Review of Systems    See HPI    Objective      Vitals: /70 (BP Location: Right arm, Patient Position: Sitting, Cuff Size: Adult Regular)   Pulse 72   Temp 96.8  F (36  C) (Oral)   Resp 14   Ht 1.803 m (5' 11\")   Wt 67.4 kg (148 lb 9.4 oz)   SpO2 99%   BMI 20.72 kg/m    Resp: 16    Patient Vitals for the past 24 hrs:   BP Temp Temp src Pulse Resp SpO2 Height Weight   03/08/23 1111 105/70 96.8  F (36  C) Oral 72 14 99 % 1.803 m (5' 11\") 67.4 kg (148 lb 9.4 oz)       Vital signs reviewed by: Juna Acosta PA-C    Physical Exam   Constitutional: Patient is alert and cooperative. Mild acute distress.  Mouth: Mucous membranes are moist. Normal tongue and tonsil. Posterior oropharynx is clear.  Cardiovascular: Regular rate and rhythm  Pulmonary/Chest: Lungs are clear to auscultation throughout. Effort normal. No respiratory distress. No wheezes, rales or rhonchi.  Point tenderness to the left costosternal margin.  No crepitus.  GI: Abdomen is soft and non-tender throughout. No CVA tenderness bilaterally.  Neurological: Alert and oriented x3.   MSK: No lower extremity edema bilaterally.  Negative Homans' sign.  Skin: No rash noted on visualized skin.  Psychiatric:The patient has a normal mood and affect.     Labs/Imaging:  Results for orders placed or performed in visit on 03/08/23   D dimer quantitative     Status: Normal   Result Value Ref Range    D-Dimer Quantitative 0.42 0.00 - 0.50 ug/mL FEU    Narrative    This D-dimer assay is intended for use in conjunction with a clinical pretest probability assessment model to exclude pulmonary embolism (PE) and deep venous thrombosis (DVT) in outpatients suspected of PE or DVT. The cut-off value is " 0.50 ug/mL FEU.   Basic metabolic panel     Status: Normal   Result Value Ref Range    Sodium 138 133 - 144 mmol/L    Potassium 4.3 3.4 - 5.3 mmol/L    Chloride 107 94 - 109 mmol/L    Carbon Dioxide (CO2) 26 20 - 32 mmol/L    Anion Gap 5 3 - 14 mmol/L    Urea Nitrogen 11 7 - 30 mg/dL    Creatinine 0.78 0.52 - 1.04 mg/dL    Calcium 9.6 8.5 - 10.1 mg/dL    Glucose 82 70 - 99 mg/dL    GFR Estimate >90 >60 mL/min/1.73m2   Results for orders placed or performed in visit on 03/08/23   XR Chest 2 Views     Status: None    Narrative    CHEST TWO VIEWS  3/8/2023 10:32 AM     HISTORY:  Chest pain, unspecified type.    COMPARISON: None.      Impression    IMPRESSION: Mild hyperinflation both lungs. The lungs are otherwise  clear. No pleural effusions. No pneumothorax.    JOCE BORDEN MD         SYSTEM ID:  [id number]           Juan Acosta PA-C, March 8, 2023

## 2023-03-08 NOTE — PROGRESS NOTES
Chief Complaint:     Chief Complaint   Patient presents with     Covid Concern     Covid positive 2/27/23 now experiencing pain with coughing and taking deep breaths       No results found for any visits on 03/08/23.    Medical Decision Making:    Vital signs reviewed by Clarence Jones PA-C  /77   Pulse 66   Temp 97.1  F (36.2  C) (Tympanic)   Wt 67.4 kg (148 lb 9.6 oz)   SpO2 100%   BMI 20.57 kg/m      Differential Diagnosis:  URI Adult/Peds:  Pneumonia, Viral syndrome and Viral upper respiratory illness  Cardiac: Acute MI  Chest wall Pain  Pleurisy  PE        ASSESSMENT    1. Chest pain, unspecified type        PLAN    Patient is in no acute distress.  She is afebrile with stable vital signs.  Temp is 97.1 in clinic today, lung sounds were clear, and O2 sats at 100% on RA.    CXR was negative for any acute infiltrates or consolidations per my read.    At this time I can not rule out cardiac cause, or PE.  Patient instructed to go to the [facility] now for further evaluation and imaging.  [facility] staff notified and handoff completed with [facility] provider.  Follow up with your PCP in 1 week if symptoms are not improving.  Patient verbalized understanding and agreed with this plan.    Labs:    No results found for any visits on 03/08/23.     Vital signs reviewed by Clarence Jones PA-C  /77   Pulse 66   Temp 97.1  F (36.2  C) (Tympanic)   Wt 67.4 kg (148 lb 9.6 oz)   SpO2 100%   BMI 20.57 kg/m      Current Meds      Current Outpatient Medications:      acyclovir (ZOVIRAX) 400 MG tablet, TAKE 1 TABLET BY MOUTH TWICE A DAY, Disp: 180 tablet, Rfl: 0     albuterol (PROAIR RESPICLICK) 108 (90 Base) MCG/ACT inhaler, Inhale 1-2 puffs into the lungs every 6 hours as needed for shortness of breath / dyspnea or wheezing As PRN, Disp: 1 each, Rfl: 2     budesonide-formoterol (SYMBICORT) 160-4.5 MCG/ACT Inhaler, Inhale 1-2 puff(s) twice a day by inhalation route., Disp: 18 g, Rfl: 1     busPIRone (BUSPAR) 15 MG tablet,  TAKE 1 TABLET BY MOUTH TWICE A DAY, Disp: 180 tablet, Rfl: 0     escitalopram (LEXAPRO) 10 MG tablet, TAKE 1 TABLET (10 MG) BY MOUTH DAILY., Disp: 90 tablet, Rfl: 1    Current Facility-Administered Medications:      3 mL bupivacaine (MARCAINE) preservative free injection 0.25% (10 mL vial), 3 mL, , , Usman Briceno MD, 3 mL at 08/03/22 1036     triamcinolone (KENALOG-40) injection 40 mg, 40 mg, , , Usman Briceno MD, 40 mg at 08/03/22 1036      Respiratory History    occasional episodes of bronchitis and asthma      SUBJECTIVE    HPI: Medina Carvajal is an 37 year old female who presents with chest congestion, cough nonproductive, occasional and chest pain with coughing and deep breathing.  Patient tested positive for COVID 2/27/2023, and chest pain started last night and has worsened.  The pain is substernal in nature and does not radiate.  There is no palpitations, shortness of breath and wheezing.  Patient is eating and drinking well.  No fever, nausea, vomiting, or diarrhea.    Patient denies any recent travel or exposure to known COVID positive tested individual.      ROS:     Review of Systems   Constitutional: Negative for chills and fever.   HENT: Negative for congestion, ear pain, rhinorrhea and sore throat.    Eyes: Negative.    Respiratory: Negative.  Negative for cough and shortness of breath.    Cardiovascular: Positive for chest pain. Negative for palpitations.   Gastrointestinal: Negative for diarrhea, nausea and vomiting.   Endocrine: Negative.    Genitourinary: Negative.    Musculoskeletal: Negative.  Negative for arthralgias, back pain, joint swelling, myalgias, neck pain and neck stiffness.   Skin: Negative.  Negative for rash and wound.   Allergic/Immunologic: Negative.  Negative for immunocompromised state.   Neurological: Negative for dizziness, weakness, light-headedness and headaches.         Family History   No family history on file.     Problem history  Patient Active Problem  List   Diagnosis     Mild recurrent major depression (H)     EVELIN (generalized anxiety disorder)     Recurrent herpes labialis     Moderate persistent asthma without complication     Non-seasonal allergic rhinitis, unspecified trigger        Allergies  No Known Allergies     Social History  Social History     Socioeconomic History     Marital status:      Spouse name: Not on file     Number of children: Not on file     Years of education: Not on file     Highest education level: Not on file   Occupational History     Not on file   Tobacco Use     Smoking status: Never     Smokeless tobacco: Never   Substance and Sexual Activity     Alcohol use: Not Currently     Drug use: Never     Sexual activity: Yes     Partners: Male     Birth control/protection: Condom   Other Topics Concern     Not on file   Social History Narrative     Not on file     Social Determinants of Health     Financial Resource Strain: Not on file   Food Insecurity: Not on file   Transportation Needs: Not on file   Physical Activity: Not on file   Stress: Not on file   Social Connections: Not on file   Intimate Partner Violence: Not on file   Housing Stability: Not on file        OBJECTIVE     Vital signs reviewed by Clarence Jones PA-C  /77   Pulse 66   Temp 97.1  F (36.2  C) (Tympanic)   Wt 67.4 kg (148 lb 9.6 oz)   SpO2 100%   BMI 20.57 kg/m       Physical Exam  Vitals and nursing note reviewed.   Constitutional:       General: She is not in acute distress.     Appearance: She is well-developed. She is not ill-appearing, toxic-appearing or diaphoretic.   HENT:      Head: Normocephalic and atraumatic.      Right Ear: Hearing, tympanic membrane, ear canal and external ear normal. Tympanic membrane is not perforated, erythematous, retracted or bulging.      Left Ear: Hearing, tympanic membrane, ear canal and external ear normal. Tympanic membrane is not perforated, erythematous, retracted or bulging.      Nose: Congestion present.  No mucosal edema or rhinorrhea.      Right Sinus: No maxillary sinus tenderness or frontal sinus tenderness.      Left Sinus: No maxillary sinus tenderness or frontal sinus tenderness.      Mouth/Throat:      Pharynx: No pharyngeal swelling, oropharyngeal exudate, posterior oropharyngeal erythema or uvula swelling.      Tonsils: No tonsillar exudate or tonsillar abscesses. 0 on the right. 0 on the left.   Eyes:      General:         Right eye: No discharge.         Left eye: No discharge.      Pupils: Pupils are equal, round, and reactive to light.   Cardiovascular:      Rate and Rhythm: Normal rate and regular rhythm.      Heart sounds: Normal heart sounds. No murmur heard.    No friction rub. No gallop.   Pulmonary:      Effort: Pulmonary effort is normal. No respiratory distress.      Breath sounds: Normal breath sounds. No decreased breath sounds, wheezing, rhonchi or rales.   Chest:      Chest wall: No tenderness.   Abdominal:      General: Bowel sounds are normal. There is no distension.      Palpations: Abdomen is soft. There is no mass.      Tenderness: There is no abdominal tenderness. There is no guarding.   Musculoskeletal:      Cervical back: Normal range of motion and neck supple.   Lymphadenopathy:      Head:      Right side of head: No submental, submandibular, tonsillar, preauricular or posterior auricular adenopathy.      Left side of head: No submental, submandibular, tonsillar, preauricular or posterior auricular adenopathy.      Cervical: No cervical adenopathy.      Right cervical: No superficial or posterior cervical adenopathy.     Left cervical: No superficial or posterior cervical adenopathy.   Skin:     General: Skin is warm and dry.      Findings: No rash.   Neurological:      Mental Status: She is alert and oriented to person, place, and time.      Cranial Nerves: No cranial nerve deficit.      Deep Tendon Reflexes: Reflexes are normal and symmetric.   Psychiatric:         Behavior:  Behavior normal. Behavior is cooperative.         Thought Content: Thought content normal.         Judgment: Judgment normal.           Clarence Jones PA-C  3/8/2023, 10:09 AM

## 2023-05-03 DIAGNOSIS — J45.40 MODERATE PERSISTENT ASTHMA WITHOUT COMPLICATION: ICD-10-CM

## 2023-05-03 RX ORDER — BUDESONIDE AND FORMOTEROL FUMARATE DIHYDRATE 160; 4.5 UG/1; UG/1
AEROSOL RESPIRATORY (INHALATION)
Qty: 10.2 G | Refills: 1 | Status: SHIPPED | OUTPATIENT
Start: 2023-05-03 | End: 2023-05-16

## 2023-05-09 ASSESSMENT — ENCOUNTER SYMPTOMS
DIZZINESS: 0
SORE THROAT: 0
COUGH: 0
EYE PAIN: 0
WEAKNESS: 0
NERVOUS/ANXIOUS: 0
MYALGIAS: 0
HEARTBURN: 0
HEMATURIA: 0
HEADACHES: 0
CONSTIPATION: 0
CHILLS: 0
BREAST MASS: 0
NAUSEA: 0
FREQUENCY: 0
PALPITATIONS: 0
PARESTHESIAS: 0
DIARRHEA: 0
HEMATOCHEZIA: 0
SHORTNESS OF BREATH: 0
ABDOMINAL PAIN: 0
DYSURIA: 0
FEVER: 0
ARTHRALGIAS: 0
JOINT SWELLING: 0

## 2023-05-09 ASSESSMENT — ASTHMA QUESTIONNAIRES
ACT_TOTALSCORE: 22
QUESTION_4 LAST FOUR WEEKS HOW OFTEN HAVE YOU USED YOUR RESCUE INHALER OR NEBULIZER MEDICATION (SUCH AS ALBUTEROL): ONCE A WEEK OR LESS
QUESTION_2 LAST FOUR WEEKS HOW OFTEN HAVE YOU HAD SHORTNESS OF BREATH: ONCE OR TWICE A WEEK
QUESTION_5 LAST FOUR WEEKS HOW WOULD YOU RATE YOUR ASTHMA CONTROL: WELL CONTROLLED
ACT_TOTALSCORE: 22
QUESTION_3 LAST FOUR WEEKS HOW OFTEN DID YOUR ASTHMA SYMPTOMS (WHEEZING, COUGHING, SHORTNESS OF BREATH, CHEST TIGHTNESS OR PAIN) WAKE YOU UP AT NIGHT OR EARLIER THAN USUAL IN THE MORNING: NOT AT ALL
QUESTION_1 LAST FOUR WEEKS HOW MUCH OF THE TIME DID YOUR ASTHMA KEEP YOU FROM GETTING AS MUCH DONE AT WORK, SCHOOL OR AT HOME: NONE OF THE TIME

## 2023-05-16 ENCOUNTER — OFFICE VISIT (OUTPATIENT)
Dept: FAMILY MEDICINE | Facility: CLINIC | Age: 38
End: 2023-05-16
Payer: COMMERCIAL

## 2023-05-16 VITALS
WEIGHT: 150 LBS | DIASTOLIC BLOOD PRESSURE: 67 MMHG | TEMPERATURE: 99.8 F | HEART RATE: 59 BPM | OXYGEN SATURATION: 100 % | HEIGHT: 71 IN | SYSTOLIC BLOOD PRESSURE: 103 MMHG | BODY MASS INDEX: 21 KG/M2

## 2023-05-16 DIAGNOSIS — Z11.59 NEED FOR HEPATITIS C SCREENING TEST: ICD-10-CM

## 2023-05-16 DIAGNOSIS — J45.40 MODERATE PERSISTENT ASTHMA WITHOUT COMPLICATION: ICD-10-CM

## 2023-05-16 DIAGNOSIS — Z12.4 CERVICAL CANCER SCREENING: ICD-10-CM

## 2023-05-16 DIAGNOSIS — B00.1 RECURRENT HERPES LABIALIS: ICD-10-CM

## 2023-05-16 DIAGNOSIS — F33.0 MILD RECURRENT MAJOR DEPRESSION (H): ICD-10-CM

## 2023-05-16 DIAGNOSIS — Z11.4 SCREENING FOR HIV (HUMAN IMMUNODEFICIENCY VIRUS): ICD-10-CM

## 2023-05-16 DIAGNOSIS — Z00.00 ROUTINE GENERAL MEDICAL EXAMINATION AT A HEALTH CARE FACILITY: Primary | ICD-10-CM

## 2023-05-16 DIAGNOSIS — L57.0 BENIGN KERATOSIS OF SKIN: ICD-10-CM

## 2023-05-16 DIAGNOSIS — F41.1 GAD (GENERALIZED ANXIETY DISORDER): ICD-10-CM

## 2023-05-16 LAB
ANION GAP SERPL CALCULATED.3IONS-SCNC: 10 MMOL/L (ref 7–15)
BUN SERPL-MCNC: 10.9 MG/DL (ref 6–20)
CALCIUM SERPL-MCNC: 9.6 MG/DL (ref 8.6–10)
CHLORIDE SERPL-SCNC: 101 MMOL/L (ref 98–107)
CHOLEST SERPL-MCNC: 143 MG/DL
CREAT SERPL-MCNC: 0.84 MG/DL (ref 0.51–0.95)
DEPRECATED HCO3 PLAS-SCNC: 25 MMOL/L (ref 22–29)
ERYTHROCYTE [DISTWIDTH] IN BLOOD BY AUTOMATED COUNT: 12.9 % (ref 10–15)
GFR SERPL CREATININE-BSD FRML MDRD: >90 ML/MIN/1.73M2
GLUCOSE SERPL-MCNC: 88 MG/DL (ref 70–99)
HCT VFR BLD AUTO: 40.5 % (ref 35–47)
HCV AB SERPL QL IA: NONREACTIVE
HDLC SERPL-MCNC: 86 MG/DL
HGB BLD-MCNC: 13.4 G/DL (ref 11.7–15.7)
HIV 1+2 AB+HIV1 P24 AG SERPL QL IA: NONREACTIVE
LDLC SERPL CALC-MCNC: 48 MG/DL
MCH RBC QN AUTO: 29.1 PG (ref 26.5–33)
MCHC RBC AUTO-ENTMCNC: 33.1 G/DL (ref 31.5–36.5)
MCV RBC AUTO: 88 FL (ref 78–100)
NONHDLC SERPL-MCNC: 57 MG/DL
PLATELET # BLD AUTO: 228 10E3/UL (ref 150–450)
POTASSIUM SERPL-SCNC: 4.4 MMOL/L (ref 3.4–5.3)
RBC # BLD AUTO: 4.6 10E6/UL (ref 3.8–5.2)
SODIUM SERPL-SCNC: 136 MMOL/L (ref 136–145)
TRIGL SERPL-MCNC: 46 MG/DL
WBC # BLD AUTO: 4.6 10E3/UL (ref 4–11)

## 2023-05-16 PROCEDURE — 99213 OFFICE O/P EST LOW 20 MIN: CPT | Mod: 25 | Performed by: FAMILY MEDICINE

## 2023-05-16 PROCEDURE — G0145 SCR C/V CYTO,THINLAYER,RESCR: HCPCS | Performed by: FAMILY MEDICINE

## 2023-05-16 PROCEDURE — 80048 BASIC METABOLIC PNL TOTAL CA: CPT | Performed by: FAMILY MEDICINE

## 2023-05-16 PROCEDURE — 90471 IMMUNIZATION ADMIN: CPT | Performed by: FAMILY MEDICINE

## 2023-05-16 PROCEDURE — 90677 PCV20 VACCINE IM: CPT | Performed by: FAMILY MEDICINE

## 2023-05-16 PROCEDURE — 36415 COLL VENOUS BLD VENIPUNCTURE: CPT | Performed by: FAMILY MEDICINE

## 2023-05-16 PROCEDURE — 85027 COMPLETE CBC AUTOMATED: CPT | Performed by: FAMILY MEDICINE

## 2023-05-16 PROCEDURE — 99395 PREV VISIT EST AGE 18-39: CPT | Mod: 25 | Performed by: FAMILY MEDICINE

## 2023-05-16 PROCEDURE — 86803 HEPATITIS C AB TEST: CPT | Performed by: FAMILY MEDICINE

## 2023-05-16 PROCEDURE — 87389 HIV-1 AG W/HIV-1&-2 AB AG IA: CPT | Performed by: FAMILY MEDICINE

## 2023-05-16 PROCEDURE — 87624 HPV HI-RISK TYP POOLED RSLT: CPT | Performed by: FAMILY MEDICINE

## 2023-05-16 PROCEDURE — 80061 LIPID PANEL: CPT | Performed by: FAMILY MEDICINE

## 2023-05-16 RX ORDER — ESCITALOPRAM OXALATE 10 MG/1
10 TABLET ORAL DAILY
Qty: 90 TABLET | Refills: 3 | Status: SHIPPED | OUTPATIENT
Start: 2023-05-16 | End: 2024-05-20

## 2023-05-16 RX ORDER — BUDESONIDE AND FORMOTEROL FUMARATE DIHYDRATE 160; 4.5 UG/1; UG/1
AEROSOL RESPIRATORY (INHALATION)
Qty: 30.6 G | Refills: 3 | Status: SHIPPED | OUTPATIENT
Start: 2023-05-16 | End: 2023-09-18

## 2023-05-16 RX ORDER — BUSPIRONE HYDROCHLORIDE 15 MG/1
15 TABLET ORAL 2 TIMES DAILY
Qty: 180 TABLET | Refills: 3 | Status: SHIPPED | OUTPATIENT
Start: 2023-05-16 | End: 2024-05-20

## 2023-05-16 RX ORDER — ACYCLOVIR 400 MG/1
TABLET ORAL
Qty: 180 TABLET | Refills: 3 | Status: SHIPPED | OUTPATIENT
Start: 2023-05-16 | End: 2024-05-20

## 2023-05-16 ASSESSMENT — PATIENT HEALTH QUESTIONNAIRE - PHQ9
SUM OF ALL RESPONSES TO PHQ QUESTIONS 1-9: 2
SUM OF ALL RESPONSES TO PHQ QUESTIONS 1-9: 2
10. IF YOU CHECKED OFF ANY PROBLEMS, HOW DIFFICULT HAVE THESE PROBLEMS MADE IT FOR YOU TO DO YOUR WORK, TAKE CARE OF THINGS AT HOME, OR GET ALONG WITH OTHER PEOPLE: NOT DIFFICULT AT ALL

## 2023-05-16 ASSESSMENT — ENCOUNTER SYMPTOMS
COUGH: 0
DYSURIA: 0
ABDOMINAL PAIN: 0
CHILLS: 0
SHORTNESS OF BREATH: 0
HEMATOCHEZIA: 0
DIZZINESS: 0
FEVER: 0
CONSTIPATION: 0
PARESTHESIAS: 0
PALPITATIONS: 0
NAUSEA: 0
HEMATURIA: 0
WEAKNESS: 0
DIARRHEA: 0
HEADACHES: 0
MYALGIAS: 0
FREQUENCY: 0
ARTHRALGIAS: 0
JOINT SWELLING: 0
SORE THROAT: 0
BREAST MASS: 0
HEARTBURN: 0
NERVOUS/ANXIOUS: 0
EYE PAIN: 0

## 2023-05-16 ASSESSMENT — PAIN SCALES - GENERAL: PAINLEVEL: NO PAIN (0)

## 2023-05-16 NOTE — PROGRESS NOTES
SUBJECTIVE:   CC: Medina is an 37 year old who presents for a preventive health visit and follow-up on baseline health conditions.       5/16/2023     7:31 AM   Additional Questions   Roomed by cam   Accompanied by none         5/16/2023     7:31 AM   Patient Reported Additional Medications   Patient reports taking the following new medications none   Patient has been advised of split billing requirements and indicates understanding: Yes  Healthy Habits:     Getting at least 3 servings of Calcium per day:  Yes    Bi-annual eye exam:  Yes    Dental care twice a year:  Yes    Sleep apnea or symptoms of sleep apnea:  None    Diet:  Regular (no restrictions)    Frequency of exercise:  2-3 days/week    Duration of exercise:  30-45 minutes    Taking medications regularly:  Yes    Barriers to taking medications:  None    Medication side effects:  None    PHQ-2 Total Score: 1    Additional concerns today:  Yes      Mole on her back.    It is slightly raised, but not especially bothersome.  She remains on buspirone and escitalopram for anxiety and depression.  She feels like those medicines work well.  She takes acyclovir daily to prevent cold sores.  She is on Symbicort daily for asthma.  She does not need to use her albuterol much at all.  She generally feels well.  She is  with no children.  Her  has had a vasectomy.    Today's PHQ-2 Score:       5/9/2023     9:39 AM   PHQ-2 ( 1999 [employer])   Q1: Little interest or pleasure in doing things 0   Q2: Feeling down, depressed or hopeless 1   PHQ-2 Score 1   Q1: Little interest or pleasure in doing things Not at all   Q2: Feeling down, depressed or hopeless Several days   PHQ-2 Score 1       Have you ever done Advance Care Planning? (For example, a Health Directive, POLST, or a discussion with a medical provider or your loved ones about your wishes): Yes, advance care planning is on file.    Social History     Tobacco Use     Smoking status: Never     Smokeless  tobacco: Never   Vaping Use     Vaping status: Not on file   Substance Use Topics     Alcohol use: Not Currently             2023     9:39 AM   Alcohol Use   Prescreen: >3 drinks/day or >7 drinks/week? Not Applicable     Reviewed orders with patient.  Reviewed health maintenance and updated orders accordingly - Yes  Patient Active Problem List   Diagnosis     Mild recurrent major depression (H)     EVELIN (generalized anxiety disorder)     Recurrent herpes labialis     Moderate persistent asthma without complication     Non-seasonal allergic rhinitis, unspecified trigger     No past surgical history on file.    Social History     Tobacco Use     Smoking status: Never     Smokeless tobacco: Never   Vaping Use     Vaping status: Not on file   Substance Use Topics     Alcohol use: Not Currently     No family history on file.      Current Outpatient Medications   Medication Sig Dispense Refill     acyclovir (ZOVIRAX) 400 MG tablet TAKE 1 TABLET BY MOUTH TWICE A  tablet 3     albuterol (PROAIR RESPICLICK) 108 (90 Base) MCG/ACT inhaler Inhale 1-2 puffs into the lungs every 6 hours as needed for shortness of breath or wheezing As PRN 1 each 2     budesonide-formoterol (SYMBICORT) 160-4.5 MCG/ACT Inhaler INHALE 1-2 PUFF(S) BY MOUTH TWICE A DAY 30.6 g 3     busPIRone (BUSPAR) 15 MG tablet Take 1 tablet (15 mg) by mouth 2 times daily 180 tablet 3     escitalopram (LEXAPRO) 10 MG tablet Take 1 tablet (10 mg) by mouth daily 90 tablet 3     benzonatate (TESSALON) 100 MG capsule Take 1 capsule (100 mg) by mouth 2 times daily as needed for cough 20 capsule 0     cyclobenzaprine (FLEXERIL) 10 MG tablet Take 0.5 tablets (5 mg) by mouth 2 times daily as needed for muscle spasms 20 tablet 0     No Known Allergies    Breast Cancer Screenin/26/2022     2:21 PM   Breast CA Risk Assessment (FHS-7)   Do you have a family history of breast, colon, or ovarian cancer? No / Unknown         Patient under 40 years of age:  "Routine Mammogram Screening not recommended.   Pertinent mammograms are reviewed under the imaging tab.    History of abnormal Pap smear: NO - age 30-65 PAP every 5 years with negative HPV co-testing recommended     Reviewed and updated as needed this visit by clinical staff    Allergies  Meds              Reviewed and updated as needed this visit by Provider                     Review of Systems   Constitutional: Negative for chills and fever.   HENT: Negative for congestion, ear pain, hearing loss and sore throat.    Eyes: Negative for pain and visual disturbance.   Respiratory: Negative for cough and shortness of breath.    Cardiovascular: Negative for chest pain, palpitations and peripheral edema.   Gastrointestinal: Negative for abdominal pain, constipation, diarrhea, heartburn, hematochezia and nausea.   Breasts:  Negative for tenderness, breast mass and discharge.   Genitourinary: Negative for dysuria, frequency, genital sores, hematuria, pelvic pain, urgency, vaginal bleeding and vaginal discharge.   Musculoskeletal: Negative for arthralgias, joint swelling and myalgias.   Skin: Negative for rash.   Neurological: Negative for dizziness, weakness, headaches and paresthesias.   Psychiatric/Behavioral: Negative for mood changes. The patient is not nervous/anxious.         OBJECTIVE:   /67 (BP Location: Right arm, Patient Position: Sitting, Cuff Size: Adult Regular)   Pulse 59   Temp 99.8  F (37.7  C) (Temporal)   Ht 1.797 m (5' 10.75\")   Wt 68 kg (150 lb)   LMP 04/28/2023   SpO2 100%   BMI 21.07 kg/m    Physical Exam  GENERAL: healthy, alert and no distress  EYES: Eyes grossly normal to inspection, PERRL and conjunctivae and sclerae normal  HENT: Grossly normal  NECK: no adenopathy, no asymmetry, masses, or scars and thyroid normal to palpation  RESP: lungs clear to auscultation - no rales, rhonchi or wheezes  BREAST: Fibrocystic changes bilaterally  CV: regular rate and rhythm, normal S1 S2, no " S3 or S4, no murmur, click or rub, no peripheral edema and peripheral pulses strong  ABDOMEN: soft, nontender, no hepatosplenomegaly, no masses    (female): normal female external genitalia, normal urethral meatus, vaginal mucosa pink, moist, well rugated, and normal cervix/adnexa/uterus without masses or discharge  MS: no gross musculoskeletal defects noted, no edema  SKIN: no suspicious lesions or rashes.  She has a slightly raised hyperkeratotic tan-colored half centimeter diameter skin lesion on the left mid back which looks most consistent with a seborrheic keratosis.  She also has a small fleshy 2 mm diameter raised growth on the right upper mid back consistent with an intradermal nevus.  No skin cancers are seen.  NEURO: Normal strength and tone, mentation intact and speech normal  PSYCH: mentation appears normal, affect normal/bright.  She scored a 2 on her PHQ-9 today.    She scored a 22 on her ACT.    Diagnostic Test Results:  Labs reviewed in Epic    ASSESSMENT/PLAN:       ICD-10-CM    1. Routine general medical examination at a health care facility  Z00.00 Pneumococcal 20 Valent Conjugate (Prevnar 20)     Lipid panel reflex to direct LDL Fasting     CBC with platelets     Basic metabolic panel     Lipid panel reflex to direct LDL Fasting     CBC with platelets     Basic metabolic panel     CANCELED: Glucose      2. Moderate persistent asthma without complication  J45.40 Pneumococcal 20 Valent Conjugate (Prevnar 20)     albuterol (PROAIR RESPICLICK) 108 (90 Base) MCG/ACT inhaler     budesonide-formoterol (SYMBICORT) 160-4.5 MCG/ACT Inhaler      3. Recurrent herpes labialis  B00.1 acyclovir (ZOVIRAX) 400 MG tablet      4. EVELIN (generalized anxiety disorder)  F41.1 busPIRone (BUSPAR) 15 MG tablet     escitalopram (LEXAPRO) 10 MG tablet      5. Mild recurrent major depression (H)  F33.0 escitalopram (LEXAPRO) 10 MG tablet      6. Benign keratosis of skin  L57.0       7. Screening for HIV (human  immunodeficiency virus)  Z11.4 HIV Antigen Antibody Combo     HIV Antigen Antibody Combo      8. Need for hepatitis C screening test  Z11.59 Hepatitis C Screen Reflex to HCV RNA Quant and Genotype     Hepatitis C Screen Reflex to HCV RNA Quant and Genotype      9. Cervical cancer screening  Z12.4 Pap Screen with HPV - recommended age 30 - 65 years        Blood pressure other vital signs look good  We discussed the above conditions  We will plan to continue her same medications  We will check fasting labs today as above  Reassurance about the benign skin lesions  We will give her a Prevnar 20 vaccine  Plan a recheck in 1 year, or sooner prn      COUNSELING:  Reviewed preventive health counseling, as reflected in patient instructions       Regular exercise       Healthy diet/nutrition       Immunizations    Vaccinated for: Pneumococcal              She reports that she has never smoked. She has never used smokeless tobacco.      Rolando Palacios MD  [facility] [place]      Answers for HPI/ROS submitted by the patient on 5/16/2023  If you checked off any problems, how difficult have these problems made it for you to do your work, take care of things at home, or get along with other people?: Not difficult at all  PHQ9 TOTAL SCORE: 2

## 2023-05-17 NOTE — RESULT ENCOUNTER NOTE
Medina,  Your lab results all look nice and healthy including cholesterol values, electrolytes, kidney tests, blood sugar, blood counts, and screening tests for HIV and hepatitis C.  Your Pap and HPV results will come back later.  Keep up the good work!    Rolando Palacios MD

## 2023-05-19 LAB
BKR LAB AP GYN ADEQUACY: NORMAL
BKR LAB AP GYN INTERPRETATION: NORMAL
BKR LAB AP HPV REFLEX: NORMAL
BKR LAB AP LMP: NORMAL
BKR LAB AP PREVIOUS ABNORMAL: NORMAL
PATH REPORT.COMMENTS IMP SPEC: NORMAL
PATH REPORT.COMMENTS IMP SPEC: NORMAL
PATH REPORT.RELEVANT HX SPEC: NORMAL

## 2023-05-23 LAB
HUMAN PAPILLOMA VIRUS 16 DNA: NEGATIVE
HUMAN PAPILLOMA VIRUS 18 DNA: NEGATIVE
HUMAN PAPILLOMA VIRUS FINAL DIAGNOSIS: NORMAL
HUMAN PAPILLOMA VIRUS OTHER HR: NEGATIVE

## 2023-10-26 ENCOUNTER — NURSE TRIAGE (OUTPATIENT)
Dept: FAMILY MEDICINE | Facility: CLINIC | Age: 38
End: 2023-10-26

## 2023-10-26 ENCOUNTER — OFFICE VISIT (OUTPATIENT)
Dept: FAMILY MEDICINE | Facility: CLINIC | Age: 38
End: 2023-10-26
Payer: COMMERCIAL

## 2023-10-26 VITALS
SYSTOLIC BLOOD PRESSURE: 109 MMHG | RESPIRATION RATE: 12 BRPM | HEART RATE: 60 BPM | BODY MASS INDEX: 20.82 KG/M2 | HEIGHT: 71 IN | DIASTOLIC BLOOD PRESSURE: 67 MMHG | OXYGEN SATURATION: 100 % | WEIGHT: 148.7 LBS | TEMPERATURE: 99.2 F

## 2023-10-26 DIAGNOSIS — N20.0 KIDNEY STONE: Primary | ICD-10-CM

## 2023-10-26 PROCEDURE — 99213 OFFICE O/P EST LOW 20 MIN: CPT | Performed by: NURSE PRACTITIONER

## 2023-10-26 ASSESSMENT — PATIENT HEALTH QUESTIONNAIRE - PHQ9
SUM OF ALL RESPONSES TO PHQ QUESTIONS 1-9: 4
10. IF YOU CHECKED OFF ANY PROBLEMS, HOW DIFFICULT HAVE THESE PROBLEMS MADE IT FOR YOU TO DO YOUR WORK, TAKE CARE OF THINGS AT HOME, OR GET ALONG WITH OTHER PEOPLE: SOMEWHAT DIFFICULT
SUM OF ALL RESPONSES TO PHQ QUESTIONS 1-9: 4

## 2023-10-26 ASSESSMENT — PAIN SCALES - GENERAL: PAINLEVEL: SEVERE PAIN (7)

## 2023-10-26 NOTE — PROGRESS NOTES
"  ICD-10-CM    1. Kidney stone  N20.0         Discussed supportive care and expectations; Discussed indications to return to ER. see patient instructions    Taye Haney is a 38 year old, presenting for the following health issues:  ER F/U      10/26/2023    11:19 AM   Additional Questions   Roomed by Mikie Pardo       [facility]   ED/UC Followup:    Facility:  [facility]   Date of visit: 10/24/2023  Reason for visit: Kidney Stones  Current Status: Still in pain, but not as bad as when she was in the emergency room . She has been able to drink fluids, although she has a very low appetite. She is alternating ibuprofen/tylenol. Pain level has been fluctuating. She has been able to sleep, and is taking short walks as tolerated. NO urinary symptoms.      Review of Systems   Constitutional, HEENT, cardiovascular, pulmonary, gi and gu systems are negative, except as otherwise noted.      Objective    /67 (BP Location: Left arm, Patient Position: Sitting, Cuff Size: Adult Regular)   Pulse 60   Temp 99.2  F (37.3  C) (Temporal)   Resp 12   Ht 1.794 m (5' 10.63\")   Wt 67.4 kg (148 lb 11.2 oz)   LMP  (LMP Unknown)   SpO2 100%   BMI 20.96 kg/m    Body mass index is 20.96 kg/m .  Physical Exam   GENERAL: healthy, alert, mild distress, and fatigued  NECK: no adenopathy, no asymmetry, masses, or scars and thyroid normal to palpation  RESP: lungs clear to auscultation - no rales, rhonchi or wheezes  CV: regular rate and rhythm, normal S1 S2, no S3 or S4, no murmur, click or rub, no peripheral edema and peripheral pulses strong  ABDOMEN: tenderness RLQ and bowel sounds normal  MS: no gross musculoskeletal defects noted, no edema  SKIN: no suspicious lesions or rashes  NEURO: Normal strength and tone, mentation intact and speech normal    Office Visit on 05/16/2023   Component Date Value Ref Range Status    Interpretation 05/16/2023 Negative for Intraepithelial Lesion or Malignancy " (NILM)    Final    Comment 05/16/2023    Final                    Value:This result contains rich text formatting which cannot be displayed here.    Specimen Adequacy 05/16/2023 Satisfactory for evaluation, endocervical/transformation zone component absent   Final    Clinical Information 05/16/2023    Final                    Value:This result contains rich text formatting which cannot be displayed here.    LMP/Menopause Date 05/16/2023    Final                    Value:This result contains rich text formatting which cannot be displayed here.    Reflex Testing 05/16/2023 Yes regardless of result   Final    Previous Abnormal? 05/16/2023    Final                    Value:This result contains rich text formatting which cannot be displayed here.    Performing Labs 05/16/2023    Final                    Value:This result contains rich text formatting which cannot be displayed here.    HIV Antigen Antibody Combo 05/16/2023 Nonreactive  Nonreactive Final    HIV-1 p24 Ag & HIV-1/HIV-2 Ab Not Detected    Hepatitis C Antibody 05/16/2023 Nonreactive  Nonreactive Final    Cholesterol 05/16/2023 143  <200 mg/dL Final    Triglycerides 05/16/2023 46  <150 mg/dL Final    Direct Measure HDL 05/16/2023 86  >=50 mg/dL Final    LDL Cholesterol Calculated 05/16/2023 48  <=100 mg/dL Final    Non HDL Cholesterol 05/16/2023 57  <130 mg/dL Final    WBC Count 05/16/2023 4.6  4.0 - 11.0 10e3/uL Final    RBC Count 05/16/2023 4.60  3.80 - 5.20 10e6/uL Final    Hemoglobin 05/16/2023 13.4  11.7 - 15.7 g/dL Final    Hematocrit 05/16/2023 40.5  35.0 - 47.0 % Final    MCV 05/16/2023 88  78 - 100 fL Final    MCH 05/16/2023 29.1  26.5 - 33.0 pg Final    MCHC 05/16/2023 33.1  31.5 - 36.5 g/dL Final    RDW 05/16/2023 12.9  10.0 - 15.0 % Final    Platelet Count 05/16/2023 228  150 - 450 10e3/uL Final    Sodium 05/16/2023 136  136 - 145 mmol/L Final    Potassium 05/16/2023 4.4  3.4 - 5.3 mmol/L Final    Chloride 05/16/2023 101  98 - 107 mmol/L Final     Carbon Dioxide (CO2) 05/16/2023 25  22 - 29 mmol/L Final    Anion Gap 05/16/2023 10  7 - 15 mmol/L Final    Urea Nitrogen 05/16/2023 10.9  6.0 - 20.0 mg/dL Final    Creatinine 05/16/2023 0.84  0.51 - 0.95 mg/dL Final    Calcium 05/16/2023 9.6  8.6 - 10.0 mg/dL Final    Glucose 05/16/2023 88  70 - 99 mg/dL Final    GFR Estimate 05/16/2023 >90  >60 mL/min/1.73m2 Final    eGFR calculated using 2021 CKD-EPI equation.    Other HR HPV 05/16/2023 Negative  Negative Final    HPV16 DNA 05/16/2023 Negative  Negative Final    HPV18 DNA 05/16/2023 Negative  Negative Final    FINAL DIAGNOSIS 05/16/2023    Final                    Value:This result contains rich text formatting which cannot be displayed here.

## 2023-10-26 NOTE — PATIENT INSTRUCTIONS
-It's normal for pain to come and go.  -Be sure to keep up with ibuprofen and tylenol, and take the stronger pain medications for breakthrough pain.  -Try to keep up with physical activity as tolerated.  -Add warm baths, ice packs and heating pads.  -Make sure your protein powder does not contain too much calcium.  -If you develop a fever, severe worsening pain, go back to ER to be checked.  -If you develop more urinary urgency symptoms, you can take Azo over the counter.

## 2023-10-26 NOTE — TELEPHONE ENCOUNTER
"Received call from patient. She is calling concerned about the pain that she is still experiencing after being diagnosed with a kidney stone at the ED on 10/24.  Pt states that at discharge she was advised to see primary care or urology for follow-up  Per ED note: \"We discussed outpatient follow-up. They are comfortable with the plan. At this time I believe the patient is stable for discharge and outpatient follow-up.\"   Pt states that all day yesterday she was in bed and pain was coming and going, but increasing in frequency. She felt nauseous. No vomiting.   Pain has varied in severity 2-3/10, 6-7/10, and last night 8/10 at one point.  Blood in urine- pt unsure, but states that she just started her period.   Pt is experiencing flank pain/back pain mainly on right side.   The pain first started Monday night 10/23.  Imaging done at ED showing 2-3 cm kidney stone.  Pt states that she was given medications for treatment of nausea and pain while at ED and she was sent home with oxycodone and nausea medication.  She feels like the pain is increasing again.  States that she has pelvic pain. It hurts to lift up her leg and put pants on. Pain is located in right lower pelvic.  Denies fever.  Denies inability to urinate or pain/burning with urination.    Triage disposition: See PCP Within 24 Hours  Writer advised ED follow-up visit and scheduled patient for appt today with primary care.  Advised patient to return to ED for any severe pain, fever 103.0 or greater, inability to urinate or only urinating small amount/drops, fullness of bladder, vomiting, chills.     Reason for Disposition   [1] MODERATE pain (e.g., interferes with normal activities) AND [2] pain comes and goes AND [3] present > 24 hours    Additional Information   Negative: Shock suspected (e.g., cold/pale/clammy skin, too weak to stand, low BP, rapid pulse)   Negative: Sounds like a life-threatening emergency to the triager   Negative: [1] Back pain AND [2] " NOT recently diagnosed with a kidney stone   Negative: [1] Flank pain (i.e., pain in the side, over the lower ribs or just below the ribs) AND [2] NOT recently diagnosed with a kidney stone   Negative: [1] Unable to urinate (or only a few drops) > 4 hours AND [2] bladder feels very full (e.g., palpable bladder or strong urge to urinate)   Negative: [1] SEVERE pain (e.g., excruciating, scale 8-10) AND [2] not improved after pain medicine   Negative: SEVERE vomiting   Negative: Fever > 103 F (39.4 C)   Negative: Severe chills (i.e., feeling extremely cold WITH shaking chills)   Negative: Patient sounds very sick or weak to the triager   Negative: [1] MODERATE pain (e.g., interferes with normal activities) AND [2] constant AND [3] lasting longer than 4 hours AND [4] NOT improved with pain medicine   Negative: Passing blood or large blood clots (i.e., size > a dime)  (Exception: Pink or tea-colored urine, flecks or small strands of blood.)   Negative: Fever > 100.4 F (38.0 C)   Negative: [1] Caller has URGENT question (includes prescribed medication questions) AND [2] triager unable to answer question   Negative: SEVERE burning or pain with passing urine (urination)    Protocols used: Kidney Stone Follow-up Call-A-DIONY Bob RN   [facility]

## 2024-01-25 NOTE — PATIENT INSTRUCTIONS
If you have labs or imaging done, the results will automatically release in [employer] without an interpretation.  Your health care professional will review those results and send an interpretation with recommendations as soon as possible, but this may be 1-3 business days.    If you have any questions regarding your visit, please contact your care team.     [employer] [facility]: [phone]  [facility] CL[facility] TELEPHONE NUMBER   Tom SERNA Goyo .      [place]-DOMINIC Morales-DOMINIC Ware-Surgery Scheduler  Carlita-         Monday-[place]  8:00 am-4:00 pm  [facility]00 [facility]  Wednesday-[place] 8:00 am-4:00 pm  Thursday-[place] 8:00 am-4:00 pm    Typical Surgery Day Friday [facility]  [address] [place] [address]  PH: [phone] Fax    Imaging Scheduling all locations  PH: [phone]     [facility]  [address]  [place], [place] [address] [phone]    [facility]  [address] [place] [address]  PH: [phone]     **Surgeries** Our Surgery Schedulers will contact you to schedule. If you do not receive a call within 3 business days, please call [phone].  Urgent Care locations:  [facility]       Monday-Friday   10 am - 8 pm  Saturday and Sunday   9 am - 5 pm   [phone]   If you need a medication refill, please contact your pharmacy. Please allow 3 business days for your refill to be completed.  As always, Thank you for trusting us with your healthcare needs!

## 2024-01-29 ENCOUNTER — OFFICE VISIT (OUTPATIENT)
Dept: OBGYN | Facility: CLINIC | Age: 39
End: 2024-01-29
Payer: COMMERCIAL

## 2024-01-29 VITALS
WEIGHT: 151 LBS | DIASTOLIC BLOOD PRESSURE: 49 MMHG | BODY MASS INDEX: 21.28 KG/M2 | HEART RATE: 66 BPM | SYSTOLIC BLOOD PRESSURE: 107 MMHG | OXYGEN SATURATION: 99 %

## 2024-01-29 DIAGNOSIS — Z11.3 SCREEN FOR STD (SEXUALLY TRANSMITTED DISEASE): ICD-10-CM

## 2024-01-29 DIAGNOSIS — N94.10 DYSPAREUNIA IN FEMALE: Primary | ICD-10-CM

## 2024-01-29 LAB
CLUE CELLS: PRESENT
TRICHOMONAS, WET PREP: ABNORMAL
WBC'S/HIGH POWER FIELD, WET PREP: ABNORMAL
YEAST, WET PREP: ABNORMAL

## 2024-01-29 PROCEDURE — 99203 OFFICE O/P NEW LOW 30 MIN: CPT | Performed by: GENERAL PRACTICE

## 2024-01-29 PROCEDURE — 87340 HEPATITIS B SURFACE AG IA: CPT | Performed by: GENERAL PRACTICE

## 2024-01-29 PROCEDURE — 86780 TREPONEMA PALLIDUM: CPT | Performed by: GENERAL PRACTICE

## 2024-01-29 PROCEDURE — 87210 SMEAR WET MOUNT SALINE/INK: CPT | Performed by: GENERAL PRACTICE

## 2024-01-29 PROCEDURE — 87491 CHLMYD TRACH DNA AMP PROBE: CPT | Performed by: GENERAL PRACTICE

## 2024-01-29 PROCEDURE — 36415 COLL VENOUS BLD VENIPUNCTURE: CPT | Performed by: GENERAL PRACTICE

## 2024-01-29 PROCEDURE — 86803 HEPATITIS C AB TEST: CPT | Performed by: GENERAL PRACTICE

## 2024-01-29 PROCEDURE — 87389 HIV-1 AG W/HIV-1&-2 AB AG IA: CPT | Performed by: GENERAL PRACTICE

## 2024-01-29 PROCEDURE — 87591 N.GONORRHOEAE DNA AMP PROB: CPT | Performed by: GENERAL PRACTICE

## 2024-01-29 NOTE — PROGRESS NOTES
HPI:   Medina is a 38 year old here for painful intercourse. Had kidney stone in fall and has had dyspareunia since. Also started non-monogamous relationships about 8 months ago which is going well and no emotional concerns. Pain is noted immediately with insertion. Has tried different lubrication. Also has had bleeding in this time frame. Bleeding is spotting. Also has pain for a couple days after. Denies other abnormal discharge, vaginal bleeding, itching, burning. Denies dysuria, hematuria. BMs daily, non painful.   Desiring routine STI testing due to non-monogamous partners. No known exposure to STIs.      She is currently using vasectomy and condoms for birth control.        ROS:   Weight loss or gain: denies  Abdominal bloating / pain: denies  Appetite: normal  Energy: normal  Nausea / vomiting: denies  Fevers / chills / night sweats: denies  SOB / cough: denies  Chest pain / palpitations: denies  Diarrhea / constipation: denies  Bloody / tar-colored stools: denies  Urinary frequency / urgency / blood: denies  Headaches / vision changes: denies  Mouth sores: denies  Skin changes / rashes: denies      GYNHx:   Patient's last menstrual period was 01/17/2024 (exact date).  Cycles: Regular, q28 days, lasting 2 days  Menorrhagia: denies   Dysmenorrhea: denies  Last paps:  NILM/HPV-  Prior abnormal pap: denies  History STI: genital warts  Gardasil Hx: has NOT received    OBHx:  OB History   No obstetric history on file.    G0    PSH:   No past surgical history on file.    PMH:  History reviewed. No pertinent past medical history.   Hx kidney stone  Anxiety  Asthma      MEDs   Current Outpatient Medications   Medication    acyclovir (ZOVIRAX) 400 MG tablet    albuterol (PROAIR RESPICLICK) 108 (90 Base) MCG/ACT inhaler    budesonide-formoterol (SYMBICORT) 160-4.5 MCG/ACT Inhaler    busPIRone (BUSPAR) 15 MG tablet    escitalopram (LEXAPRO) 10 MG tablet     Current Facility-Administered Medications   Medication    3 mL  bupivacaine (MARCAINE) preservative free injection 0.25% (10 mL vial)    sodium chloride (PF) 0.9% PF flush 3 mL    triamcinolone (KENALOG-40) injection 40 mg       Allergies:  No Known Allergies    FamHx:  No family history on file.    SocHx:  Social History     Socioeconomic History    Marital status:      Spouse name: Not on file    Number of children: Not on file    Years of education: Not on file    Highest education level: Not on file   Occupational History    Not on file   Tobacco Use    Smoking status: Never    Smokeless tobacco: Never   Vaping Use    Vaping Use: Never used   Substance and Sexual Activity    Alcohol use: Not Currently    Drug use: Never    Sexual activity: Yes     Partners: Male     Birth control/protection: Condom   Other Topics Concern    Not on file   Social History Narrative    Not on file     Social Determinants of Health     Financial Resource Strain: Low Risk  (10/26/2023)    Financial Resource Strain     Within the past 12 months, have you or your family members you live with been unable to get utilities (heat, electricity) when it was really needed?: No   Food Insecurity: Low Risk  (10/26/2023)    Food Insecurity     Within the past 12 months, did you worry that your food would run out before you got money to buy more?: No     Within the past 12 months, did the food you bought just not last and you didn t have money to get more?: No   Transportation Needs: Low Risk  (10/26/2023)    Transportation Needs     Within the past 12 months, has lack of transportation kept you from medical appointments, getting your medicines, non-medical meetings or appointments, work, or from getting things that you need?: No   Physical Activity: Not on file   Stress: Not on file   Social Connections: Not on file   Interpersonal Safety: Not on file   Housing Stability: Low Risk  (10/26/2023)    Housing Stability     Do you have housing? : Yes     Are you worried about losing your housing?: No              PE:   /49 (BP Location: Left arm, Patient Position: Sitting, Cuff Size: Adult Regular)   Pulse 66   Wt 68.5 kg (151 lb)   LMP 01/17/2024 (Exact Date)   SpO2 99%   BMI 21.28 kg/m    Body mass index is 21.28 kg/m .    General Appearance:  healthy, alert, active, no distress  HEENT: NC/AT, supple, trachea midline, no goiter  CV: well perfused  Lungs: non-labored breathing  Breast: not performed  Neuro: normal gait, balance  Skin: no lesions, visible rashes/bruising  Psych: appropriate mood/affect  Abdomen: Benign and Soft, non-tender.   Pelvic:       - Ext: Vulva and perineum are normal without lesion, mass or discharge        - Urethra: normal without discharge or scarring        - Bladder: No tenderness, No masses       - Vagina: Normal appearing vaginal mucosa. No masses or lesions. Physiologic discharge. On pain mapping, patient notes pain on bilateral (L>R) levator muscles groups which are palpably tense on exam.        - Cervix: normal. On tender.  Wet prep and GC/CT collected.        - Uterus:Normal shape, position and consistency. Non tender       - Adnexa: Normal without masses or tenderness       - Rectal: deferred      RADS  None for review       LABS  None for review         A/P:  Above patient with dyspareunia presumably due to tense levator muscles. No emotional/psych concerns which could be contributory. Recommend pelvic floor physical therapy and return to clinic if not improving. Could trial muscle relaxants prior to intercourse or vaginal trigger point injections if dysparuenia persists. STI screening performed. Will notify patient of results.      Diagnosis Comments   1. Dyspareunia in female  Physical Therapy Referral       2. Screen for STD (sexually transmitted disease)  Chlamydia & Gonorrhea by PCR, [facility] - Clinic Collect, Treponema Abs w Reflex to RPR and Titer, Hepatitis C antibody, HIV Antigen Antibody Combo Cascade, Hepatitis B surface antigen, Treponema Abs w  Reflex to RPR and Titer, Wet preparation               30 min spent on the date of the encounter in chart review, patient visit, review of tests, documentation and/or discussion with other providers about the issues documented above.     Tom Nance DO, FACOG

## 2024-01-30 LAB
C TRACH DNA SPEC QL PROBE+SIG AMP: NEGATIVE
HBV SURFACE AG SERPL QL IA: NONREACTIVE
HCV AB SERPL QL IA: NONREACTIVE
HIV 1+2 AB+HIV1 P24 AG SERPL QL IA: NONREACTIVE
N GONORRHOEA DNA SPEC QL NAA+PROBE: NEGATIVE
T PALLIDUM AB SER QL: NONREACTIVE

## 2024-01-31 ENCOUNTER — MYC MEDICAL ADVICE (OUTPATIENT)
Dept: OBGYN | Facility: CLINIC | Age: 39
End: 2024-01-31
Payer: COMMERCIAL

## 2024-02-02 DIAGNOSIS — B96.89 BACTERIAL VAGINOSIS: Primary | ICD-10-CM

## 2024-02-02 DIAGNOSIS — N76.0 BACTERIAL VAGINOSIS: Primary | ICD-10-CM

## 2024-02-02 RX ORDER — METRONIDAZOLE 7.5 MG/G
1 GEL VAGINAL DAILY
Qty: 25 G | Refills: 0 | Status: SHIPPED | OUTPATIENT
Start: 2024-02-02 | End: 2024-02-07

## 2024-02-02 NOTE — TELEPHONE ENCOUNTER
Patient sending mychart message awaiting test results form 1/29/2024.    Routed to provider.     Kim COYLE RN

## 2024-04-26 ENCOUNTER — TELEPHONE (OUTPATIENT)
Dept: FAMILY MEDICINE | Facility: CLINIC | Age: 39
End: 2024-04-26
Payer: COMMERCIAL

## 2024-04-26 DIAGNOSIS — J45.40 MODERATE PERSISTENT ASTHMA WITHOUT COMPLICATION: ICD-10-CM

## 2024-04-26 RX ORDER — ALBUTEROL SULFATE 0.83 MG/ML
2.5 SOLUTION RESPIRATORY (INHALATION) EVERY 6 HOURS PRN
Qty: 90 ML | Refills: 1 | Status: SHIPPED | OUTPATIENT
Start: 2024-04-26 | End: 2024-05-20

## 2024-04-26 NOTE — TELEPHONE ENCOUNTER
Dr. Palacios,     Patient called stating she has not refilled her albuterol inhaler or neb solution in a while due to she has not had to use. However, recently had a viral illness (unsure if possible COVID/Flu or just common cold) and with combination of allergies she needs to use the inhaler and solution. Patient last seen 6 mo ago.     Ok for refill? Do you want to see her for a visit? Please advise.       Thanks,  DOMINIC Moreno  [facility]

## 2024-04-26 NOTE — TELEPHONE ENCOUNTER
I will refill her albuterol for her, but she should probably come in for a routine checkup (annual physical) sometime in the next month or so to follow-up on this and her other medications.    Rolando Palacios MD

## 2024-04-26 NOTE — TELEPHONE ENCOUNTER
Called patient and relayed message. She verbalized understanding. Scheduled appointment for physical 5/20.    Paulina Bob RN

## 2024-05-13 SDOH — HEALTH STABILITY: PHYSICAL HEALTH: ON AVERAGE, HOW MANY MINUTES DO YOU ENGAGE IN EXERCISE AT THIS LEVEL?: 50 MIN

## 2024-05-13 SDOH — HEALTH STABILITY: PHYSICAL HEALTH: ON AVERAGE, HOW MANY DAYS PER WEEK DO YOU ENGAGE IN MODERATE TO STRENUOUS EXERCISE (LIKE A BRISK WALK)?: 4 DAYS

## 2024-05-13 ASSESSMENT — ASTHMA QUESTIONNAIRES
QUESTION_2 LAST FOUR WEEKS HOW OFTEN HAVE YOU HAD SHORTNESS OF BREATH: ONCE OR TWICE A WEEK
ACT_TOTALSCORE: 19
QUESTION_4 LAST FOUR WEEKS HOW OFTEN HAVE YOU USED YOUR RESCUE INHALER OR NEBULIZER MEDICATION (SUCH AS ALBUTEROL): TWO OR THREE TIMES PER WEEK
QUESTION_1 LAST FOUR WEEKS HOW MUCH OF THE TIME DID YOUR ASTHMA KEEP YOU FROM GETTING AS MUCH DONE AT WORK, SCHOOL OR AT HOME: A LITTLE OF THE TIME
QUESTION_5 LAST FOUR WEEKS HOW WOULD YOU RATE YOUR ASTHMA CONTROL: WELL CONTROLLED
ACT_TOTALSCORE: 19
QUESTION_3 LAST FOUR WEEKS HOW OFTEN DID YOUR ASTHMA SYMPTOMS (WHEEZING, COUGHING, SHORTNESS OF BREATH, CHEST TIGHTNESS OR PAIN) WAKE YOU UP AT NIGHT OR EARLIER THAN USUAL IN THE MORNING: ONCE OR TWICE

## 2024-05-13 ASSESSMENT — SOCIAL DETERMINANTS OF HEALTH (SDOH): HOW OFTEN DO YOU GET TOGETHER WITH FRIENDS OR RELATIVES?: ONCE A WEEK

## 2024-05-20 ENCOUNTER — TELEPHONE (OUTPATIENT)
Dept: FAMILY MEDICINE | Facility: CLINIC | Age: 39
End: 2024-05-20

## 2024-05-20 ENCOUNTER — OFFICE VISIT (OUTPATIENT)
Dept: FAMILY MEDICINE | Facility: CLINIC | Age: 39
End: 2024-05-20
Payer: COMMERCIAL

## 2024-05-20 VITALS
BODY MASS INDEX: 21.14 KG/M2 | SYSTOLIC BLOOD PRESSURE: 102 MMHG | HEIGHT: 71 IN | WEIGHT: 151 LBS | RESPIRATION RATE: 20 BRPM | TEMPERATURE: 98.7 F | DIASTOLIC BLOOD PRESSURE: 50 MMHG | OXYGEN SATURATION: 100 % | HEART RATE: 60 BPM

## 2024-05-20 DIAGNOSIS — F33.0 MILD RECURRENT MAJOR DEPRESSION (H): ICD-10-CM

## 2024-05-20 DIAGNOSIS — J30.1 SEASONAL ALLERGIC RHINITIS DUE TO POLLEN: ICD-10-CM

## 2024-05-20 DIAGNOSIS — J45.40 MODERATE PERSISTENT ASTHMA WITHOUT COMPLICATION: ICD-10-CM

## 2024-05-20 DIAGNOSIS — Z00.00 ROUTINE GENERAL MEDICAL EXAMINATION AT A HEALTH CARE FACILITY: Primary | ICD-10-CM

## 2024-05-20 DIAGNOSIS — B00.1 RECURRENT HERPES LABIALIS: ICD-10-CM

## 2024-05-20 DIAGNOSIS — F41.1 GAD (GENERALIZED ANXIETY DISORDER): ICD-10-CM

## 2024-05-20 PROCEDURE — 99395 PREV VISIT EST AGE 18-39: CPT | Performed by: FAMILY MEDICINE

## 2024-05-20 PROCEDURE — 99213 OFFICE O/P EST LOW 20 MIN: CPT | Mod: 25 | Performed by: FAMILY MEDICINE

## 2024-05-20 RX ORDER — ESCITALOPRAM OXALATE 10 MG/1
10 TABLET ORAL DAILY
Qty: 90 TABLET | Refills: 3 | Status: SHIPPED | OUTPATIENT
Start: 2024-05-20 | End: 2024-08-26 | Stop reason: ALTCHOICE

## 2024-05-20 RX ORDER — BUSPIRONE HYDROCHLORIDE 15 MG/1
15 TABLET ORAL 2 TIMES DAILY
Qty: 180 TABLET | Refills: 3 | Status: SHIPPED | OUTPATIENT
Start: 2024-05-20

## 2024-05-20 RX ORDER — ALBUTEROL SULFATE 0.83 MG/ML
2.5 SOLUTION RESPIRATORY (INHALATION) EVERY 6 HOURS PRN
Qty: 90 ML | Refills: 1 | Status: SHIPPED | OUTPATIENT
Start: 2024-05-20

## 2024-05-20 RX ORDER — ACYCLOVIR 400 MG/1
TABLET ORAL
Qty: 180 TABLET | Refills: 3 | Status: SHIPPED | OUTPATIENT
Start: 2024-05-20

## 2024-05-20 RX ORDER — BUDESONIDE AND FORMOTEROL FUMARATE DIHYDRATE 160; 4.5 UG/1; UG/1
AEROSOL RESPIRATORY (INHALATION)
Qty: 10.2 G | Refills: 0 | Status: SHIPPED | OUTPATIENT
Start: 2024-05-20 | End: 2024-07-01

## 2024-05-20 ASSESSMENT — PAIN SCALES - GENERAL: PAINLEVEL: NO PAIN (0)

## 2024-05-20 NOTE — LETTER
My Depression Action Plan  Name: Medina Carvajal   Date of Birth 1985  Date: 5/20/2024    My doctor: Rolando Palacios   My clinic: [place] [id number] [facility]  [place] [address]  [phone]            GREEN    ZONE   Good Control    What it looks like:   Things are going generally well. You have normal ups and downs. You may even feel depressed from time to time, but bad moods usually last less than a day.   What you need to do:  Continue to care for yourself (see self care plan)  Check your depression survival kit and update it as needed  Follow your physician s recommendations including any medication.  Do not stop taking medication unless you consult with your physician first.             YELLOW         ZONE Getting Worse    What it looks like:   Depression is starting to interfere with your life.   It may be hard to get out of bed; you may be starting to isolate yourself from others.  Symptoms of depression are starting to last most all day and this has happened for several days.   You may have suicidal thoughts but they are not constant.   What you need to do:     Call your care team. Your response to treatment will improve if you keep your care team informed of your progress. Yellow periods are signs an adjustment may need to be made.     Continue your self-care.  Just get dressed and ready for the day.  Don't give yourself time to talk yourself out of it.    Talk to someone in your support network.    Open up your Depression Self-Care Plan/Wellness Kit.             RED    ZONE Medical Alert - Get Help    What it looks like:   Depression is seriously interfering with your life.   You may experience these or other symptoms: You can t get out of bed most days, can t work or engage in other necessary activities, you have trouble taking care of basic hygiene, or basic responsibilities, thoughts of suicide or death that will not go away, self-injurious behavior.      What you need to do:  Call your care team and request a same-day appointment. If they are not available (weekends or after hours) call your local crisis line, emergency room or 911.          Depression Self-Care Plan / Wellness Kit    Many people find that medication and therapy are helpful treatments for managing depression. In addition, making small changes to your everyday life can help to boost your mood and improve your wellbeing. Below are some tips for you to consider. Be sure to talk with your medical provider and/or behavioral health consultant if your symptoms are worsening or not improving.     Sleep   Sleep hygiene  means all of the habits that support good, restful sleep. It includes maintaining a consistent bedtime and wake time, using your bedroom only for sleeping or sex, and keeping the bedroom dark and free of distractions like a computer, smartphone, or television.     Develop a Healthy Routine  Maintain good hygiene. Get out of bed in the morning, make your bed, brush your teeth, take a shower, and get dressed. Don t spend too much time viewing media that makes you feel stressed. Find time to relax each day.    Exercise  Get some form of exercise every day. This will help reduce pain and release endorphins, the  feel good  chemicals in your brain. It can be as simple as just going for a walk or doing some gardening, anything that will get you moving.      Diet  Strive to eat healthy foods, including fruits and vegetables. Drink plenty of water. Avoid excessive sugar, caffeine, alcohol, and other mood-altering substances.     Stay Connected with Others  Stay in touch with friends and family members.    Manage Your Mood  Try deep breathing, massage therapy, biofeedback, or meditation. Take part in fun activities when you can. Try to find something to smile about each day.     Psychotherapy  Be open to working with a therapist if your provider recommends it.     Medication  Be sure to take your  medication as prescribed. Most anti-depressants need to be taken every day. It usually takes several weeks for medications to work. Not all medicines work for all people. It is important to follow-up with your provider to make sure you have a treatment plan that is working for you. Do not stop your medication abruptly without first discussing it with your provider.    Crisis Resources   These hotlines are for both adults and children. They and are open 24 hours a day, 7 days a week unless noted otherwise.    National Suicide Prevention Lifeline   988 or 5-120-601-QXCM (8280)    Crisis Text Line    www.crisistextline.org  Text HOME to 360742 from anywhere in the [place], anytime, about any type of crisis. A live, trained crisis counselor will receive the text and respond quickly.    [place] Lifeline for LGBTQ Youth  A national crisis intervention and suicide lifeline for LGBTQ youth under 25. Provides a safe place to talk without judgement. Call 1-813.514.9891; text START to 126402 or visit www.theProQuovorproject.org to talk to a trained counselor.    For Critical access hospital crisis numbers, visit the [facility] website at:  https://[place].gov/dhs/people-we-serve/adults/health-care/mental-health/resources/crisis-contacts.jsp

## 2024-05-20 NOTE — PROGRESS NOTES
Preventive Care Visit  [facility]  Rolando Palacios MD, Family Medicine  May 20, 2024      Assessment & Plan       ICD-10-CM    1. Routine general medical examination at a health care facility  Z00.00       2. Moderate persistent asthma without complication  J45.40 albuterol (PROAIR RESPICLICK) 108 (90 Base) MCG/ACT inhaler     albuterol (PROVENTIL) (2.5 MG/3ML) 0.083% neb solution     budesonide-formoterol (SYMBICORT) 160-4.5 MCG/ACT Inhaler      3. Recurrent herpes labialis  B00.1 acyclovir (ZOVIRAX) 400 MG tablet      4. EVELIN (generalized anxiety disorder)  F41.1 busPIRone (BUSPAR) 15 MG tablet     escitalopram (LEXAPRO) 10 MG tablet      5. Mild recurrent major depression (H24)  F33.0 escitalopram (LEXAPRO) 10 MG tablet        Blood pressure other vital signs look good  We discussed the above items  Her medicines seem to be working well for her, so we will continue her same baseline medications as above  She had lab work done last year which was all normal, so elected to pass on lab work this year  Plan a tentative recheck in 1 year, or sooner prn        Counseling  Appropriate preventive services were discussed with this patient, including applicable screening as appropriate for fall prevention, nutrition, physical activity, Tobacco-use cessation, weight loss and cognition.  Checklist reviewing preventive services available has been given to the patient.  Reviewed patient's diet, addressing concerns and/or questions.   She is at risk for psychosocial distress and has been provided with information to reduce risk.         Taye Haney is a 38 year old, presenting for the following:  Physical        5/20/2024     6:56 AM   Additional Questions   Roomed by cam   Accompanied by none         5/20/2024     6:56 AM   Patient Reported Additional Medications   Patient reports taking the following new medications none        Health Care Directive  Patient does not have a Health Care Directive  or Living Will: Advance Directive received and scanned. Click on Code in the patient header to view.    HPI    She is generally feeling well.  She did have a COVID infection about a month ago.  She is currently dealing with some allergies in the last week or 2.  Her asthma has been less well-controlled because of that and she has been using her albuterol inhaler more often than usual because of that.  She feels like the escitalopram is working well for her anxiety and depression.  She generally takes the buspirone just once a day, but will occasionally take it twice a day if needed.  She uses the acyclovir once or twice a day as a preventive medicine for cold sores.      5/13/2024   General Health   How would you rate your overall physical health? Good   Feel stress (tense, anxious, or unable to sleep) Only a little   (!) STRESS CONCERN      5/13/2024   Nutrition   Three or more servings of calcium each day? Yes   Diet: Vegetarian/vegan   How many servings of fruit and vegetables per day? (!) 2-3   How many sweetened beverages each day? 0-1         5/13/2024   Exercise   Days per week of moderate/strenous exercise 4 days   Average minutes spent exercising at this level 50 min         5/13/2024   Social Factors   Frequency of gathering with friends or relatives Once a week   Worry food won't last until get money to buy more No   Food not last or not have enough money for food? No   Do you have housing?  Yes   Are you worried about losing your housing? No   Lack of transportation? No   Unable to get utilities (heat,electricity)? No         5/13/2024   Dental   Dentist two times every year? Yes         5/13/2024   TB Screening   Were you born outside of the [place]? No       Today's PHQ-9 Score:       5/20/2024     6:43 AM   PHQ-9 SCORE   PHQ-9 Total Score MyChart 2 (Minimal depression)   PHQ-9 Total Score 2         5/13/2024   Substance Use   Alcohol more than 3/day or more than 7/wk Not Applicable   Do you use any other  substances recreationally? (!) CANNABIS PRODUCTS     Social History     Tobacco Use    Smoking status: Never    Smokeless tobacco: Never   Vaping Use    Vaping status: Never Used   Substance Use Topics    Alcohol use: Not Currently    Drug use: Never          Mammogram Screening - Patient under 40 years of age: Routine Mammogram Screening not recommended.         5/13/2024   STI Screening   New sexual partner(s) since last STI/HIV test? No     History of abnormal Pap smear: No - age 30- 64 PAP with HPV every 5 years recommended        Latest Ref Rng & Units 5/16/2023     8:10 AM   PAP / HPV   PAP  Negative for Intraepithelial Lesion or Malignancy (NILM)    HPV 16 DNA Negative Negative    HPV 18 DNA Negative Negative    Other HR HPV Negative Negative            5/13/2024   Contraception/Family Planning   Questions about contraception or family planning No        Reviewed and updated as needed this visit by Provider                    Patient Active Problem List   Diagnosis    Mild recurrent major depression (H24)    EVELIN (generalized anxiety disorder)    Recurrent herpes labialis    Moderate persistent asthma without complication    Non-seasonal allergic rhinitis, unspecified trigger    Kidney stone     No past surgical history on file.    Social History     Tobacco Use    Smoking status: Never    Smokeless tobacco: Never   Substance Use Topics    Alcohol use: Not Currently     No family history on file.      Current Outpatient Medications   Medication Sig Dispense Refill    acyclovir (ZOVIRAX) 400 MG tablet TAKE 1 TABLET BY MOUTH TWICE A  tablet 3    albuterol (PROAIR RESPICLICK) 108 (90 Base) MCG/ACT inhaler Inhale 1-2 puffs into the lungs every 6 hours as needed for shortness of breath or wheezing As PRN 1 each 1    albuterol (PROVENTIL) (2.5 MG/3ML) 0.083% neb solution Take 1 vial (2.5 mg) by nebulization every 6 hours as needed for shortness of breath, wheezing or cough 90 mL 1    budesonide-formoterol  "(SYMBICORT) 160-4.5 MCG/ACT Inhaler INHALE 1-2 PUFF(S) BY MOUTH TWICE A DAY 10.2 g 0    busPIRone (BUSPAR) 15 MG tablet Take 1 tablet (15 mg) by mouth 2 times daily 180 tablet 3    escitalopram (LEXAPRO) 10 MG tablet Take 1 tablet (10 mg) by mouth daily 90 tablet 3     No Known Allergies      Review of Systems  Noncontributory except as above.     Objective    Exam  /50 (BP Location: Left arm, Patient Position: Sitting, Cuff Size: Adult Regular)   Pulse 60   Temp 98.7  F (37.1  C) (Temporal)   Resp 20   Ht 1.794 m (5' 10.63\")   Wt 68.5 kg (151 lb)   LMP 05/09/2024 (Exact Date)   SpO2 100%   BMI 21.28 kg/m     Estimated body mass index is 21.28 kg/m  as calculated from the following:    Height as of this encounter: 1.794 m (5' 10.63\").    Weight as of this encounter: 68.5 kg (151 lb).    Physical Exam  GENERAL: alert and no distress  EYES: Eyes grossly normal to inspection, PERRL and conjunctivae and sclerae normal  HENT: Some nasal congestion from allergies, otherwise unremarkable  NECK: no adenopathy, no asymmetry, masses, or scars  RESP: lungs clear to auscultation - no rales, rhonchi or wheezes  CV: regular rate and rhythm, normal S1 S2, no S3 or S4, no murmur, click or rub, no peripheral edema  ABDOMEN: soft, nontender, no hepatosplenomegaly, no masses   MS: no gross musculoskeletal defects noted, no edema  SKIN: no suspicious lesions or rashes  NEURO: Normal strength and tone, mentation intact and speech normal  PSYCH: mentation appears normal, affect normal/bright.  She scored a 2 on her PHQ-9, but is 0 on the first 2 questions.    She scored a 19 on her ACT last week, but this is a worse score than usual due to allergies.    Signed Electronically by: Rolando Palacios MD    "

## 2024-05-20 NOTE — LETTER
My Asthma Action Plan  Name: Medina Carvajal   YOB: 1985  Date: 5/20/2024   My doctor: Rolando Palacios   My clinic: [facility]      My Control Medicine: Symbicort        Dose: 1 puff twice a day   My Rescue Medicine: Albuterol        Dose: Every 6 hours as neededup    My Asthma Severity: Moderate Persistent  Avoid your asthma triggers: pollens        GREEN ZONE   Good Control  I feel good  No cough or wheeze  Can work, sleep and play without asthma symptoms       Take your asthma control medicine every day.     If exercise triggers your asthma, take your rescue medication  15 minutes before exercise or sports, and  During exercise if you have asthma symptoms  Spacer to use with inhaler: If you have a spacer, make sure to use it with your inhaler             YELLOW ZONE Getting Worse  I have ANY of these:  I do not feel good  Cough or wheeze  Chest feels tight  Wake up at night   Keep taking your Green Zone medications  Start taking your rescue medicine:  every 20 minutes for up to 1 hour. Then every 4 hours for 24-48 hours.  If you stay in the Yellow Zone for more than 12-24 hours, contact your doctor.  If you do not return to the Green Zone in 12-24 hours or you get worse, start taking your oral steroid medicine if prescribed by your provider.           RED ZONE Medical Alert - Get Help  I have ANY of these:  I feel awful  Medicine is not helping  Breathing getting harder  Trouble walking or talking  Nose opens wide to breathe       Take your rescue medicine NOW  If your provider has prescribed an oral steroid medicine, start taking it NOW  Call your doctor NOW  If you are still in the Red Zone after 20 minutes and you have not reached your doctor:  Take your rescue medicine again and  Call 911 or go to the emergency room right away    See your regular doctor within 2 weeks of an Emergency Room or Urgent Care visit for follow-up treatment.        The above medication may be  given at school or day care?: Yes and N/A (Adult Patient)  Child can carry and use inhaler(s) at school with approval of school nurse?: Yes and N/A (Adult Patient)    Electronically signed by: Rolando Palacios MD, May 20, 2024    Annual Reminders:  Meet with Asthma Educator,  Flu Shot in the Fall, consider Pneumonia Vaccination for patients with asthma (aged 19 and older).    Pharmacy: [facility]/PHARMACY #[id number] [place], [place] - [address]                    Asthma Triggers  How To Control Things That Make Your Asthma Worse    Triggers are things that make your asthma worse.  Look at the list below to help you find your triggers and what you can do about them.  You can help prevent asthma flare-ups by staying away from your triggers.      Trigger                                                          What you can do   Cigarette Smoke  Tobacco smoke can make asthma worse. Do not allow smoking in your home, car or around you.  Be sure no one smokes at a child s day care or school.  If you smoke, ask your health care provider for ways to help you quit.  Ask family members to quit too.  Ask your health care provider for a referral to Quit Plan to help you quit smoking, or call 5-328-085-PLAN.     Colds, Flu, Bronchitis  These are common triggers of asthma. Wash your hands often.  Don t touch your eyes, nose or mouth.  Get a flu shot every year.     Dust Mites  These are tiny bugs that live in cloth or carpet. They are too small to see. Wash sheets and blankets in hot water every week.   Encase pillows and mattress in dust mite proof covers.  Avoid having carpet if you can. If you have carpet, vacuum weekly.   Use a dust mask and HEPA vacuum.   Pollen and Outdoor Mold  Some people are allergic to trees, grass, or weed pollen, or molds. Try to keep your windows closed.  Limit time out doors when pollen count is high.   Ask you health care provider about taking medicine during allergy season.     Animal  Dander  Some people are allergic to skin flakes, urine or saliva from pets with fur or feathers. Keep pets with fur or feathers out of your home.    If you can t keep the pet outdoors, then keep the pet out of your bedroom.  Keep the bedroom door closed.  Keep pets off cloth furniture and away from stuffed toys.     Mice, Rats, and Cockroaches  Some people are allergic to the waste from these pests.   Cover food and garbage.  Clean up spills and food crumbs.  Store grease in the refrigerator.   Keep food out of the bedroom.   Indoor Mold  This can be a trigger if your home has high moisture. Fix leaking faucets, pipes, or other sources of water.   Clean moldy surfaces.  Dehumidify basement if it is damp and smelly.   Smoke, Strong Odors, and Sprays  These can reduce air quality. Stay away from strong odors and sprays, such as perfume, powder, hair spray, paints, smoke incense, paint, cleaning products, candles and new carpet.   Exercise or Sports  Some people with asthma have this trigger. Be active!  Ask your doctor about taking medicine before sports or exercise to prevent symptoms.    Warm up for 5-10 minutes before and after sports or exercise.     Other Triggers of Asthma  Cold air:  Cover your nose and mouth with a scarf.  Sometimes laughing or crying can be a trigger.  Some medicines and food can trigger asthma.

## 2024-05-20 NOTE — TELEPHONE ENCOUNTER
Patient Quality Outreach    Patient is due for the following:   Asthma  -  ACT needed    Next Steps:   Patient failed the ACT questionnaire and was in office today. Mailed her a copy of the ACT questionnaire so she can fill out 4 weeks from today.    Type of outreach:    Sent letter.    Next Steps:  Reach out within 90 days via  done .    Max number of attempts reached: Yes. Will try again in 90 days if patient still on fail list.    Questions for provider review:    PCP informed.          Marisel De Los Santos CMA  Chart routed to Closing encounter

## 2024-06-29 DIAGNOSIS — J45.40 MODERATE PERSISTENT ASTHMA WITHOUT COMPLICATION: ICD-10-CM

## 2024-07-01 RX ORDER — BUDESONIDE AND FORMOTEROL FUMARATE DIHYDRATE 160; 4.5 UG/1; UG/1
AEROSOL RESPIRATORY (INHALATION)
Qty: 10.2 G | Refills: 5 | Status: SHIPPED | OUTPATIENT
Start: 2024-07-01

## 2024-07-09 ENCOUNTER — MYC MEDICAL ADVICE (OUTPATIENT)
Dept: FAMILY MEDICINE | Facility: CLINIC | Age: 39
End: 2024-07-09
Payer: COMMERCIAL

## 2024-07-15 ASSESSMENT — ASTHMA QUESTIONNAIRES
QUESTION_3 LAST FOUR WEEKS HOW OFTEN DID YOUR ASTHMA SYMPTOMS (WHEEZING, COUGHING, SHORTNESS OF BREATH, CHEST TIGHTNESS OR PAIN) WAKE YOU UP AT NIGHT OR EARLIER THAN USUAL IN THE MORNING: NOT AT ALL
QUESTION_4 LAST FOUR WEEKS HOW OFTEN HAVE YOU USED YOUR RESCUE INHALER OR NEBULIZER MEDICATION (SUCH AS ALBUTEROL): ONCE A WEEK OR LESS
QUESTION_2 LAST FOUR WEEKS HOW OFTEN HAVE YOU HAD SHORTNESS OF BREATH: ONCE OR TWICE A WEEK
QUESTION_5 LAST FOUR WEEKS HOW WOULD YOU RATE YOUR ASTHMA CONTROL: COMPLETELY CONTROLLED
ACT_TOTALSCORE: 23
ACT_TOTALSCORE: 23
QUESTION_1 LAST FOUR WEEKS HOW MUCH OF THE TIME DID YOUR ASTHMA KEEP YOU FROM GETTING AS MUCH DONE AT WORK, SCHOOL OR AT HOME: NONE OF THE TIME

## 2024-07-18 ENCOUNTER — OFFICE VISIT (OUTPATIENT)
Dept: FAMILY MEDICINE | Facility: CLINIC | Age: 39
End: 2024-07-18
Payer: COMMERCIAL

## 2024-07-18 VITALS
HEART RATE: 67 BPM | DIASTOLIC BLOOD PRESSURE: 73 MMHG | BODY MASS INDEX: 21.14 KG/M2 | SYSTOLIC BLOOD PRESSURE: 114 MMHG | TEMPERATURE: 98.4 F | WEIGHT: 151 LBS | OXYGEN SATURATION: 100 % | HEIGHT: 71 IN

## 2024-07-18 DIAGNOSIS — L98.9 SKIN LESION OF BACK: ICD-10-CM

## 2024-07-18 DIAGNOSIS — L98.9 SKIN LESION OF NECK: ICD-10-CM

## 2024-07-18 DIAGNOSIS — L98.9 BENIGN SKIN LESION: Primary | ICD-10-CM

## 2024-07-18 PROCEDURE — 17110 DESTRUCTION B9 LES UP TO 14: CPT | Mod: 59 | Performed by: FAMILY MEDICINE

## 2024-07-18 PROCEDURE — 88305 TISSUE EXAM BY PATHOLOGIST: CPT | Performed by: PATHOLOGY

## 2024-07-18 PROCEDURE — 11420 EXC H-F-NK-SP B9+MARG 0.5/<: CPT | Performed by: FAMILY MEDICINE

## 2024-07-18 PROCEDURE — 11400 EXC TR-EXT B9+MARG 0.5 CM<: CPT | Performed by: FAMILY MEDICINE

## 2024-07-18 ASSESSMENT — PAIN SCALES - GENERAL: PAINLEVEL: NO PAIN (0)

## 2024-07-18 NOTE — PROGRESS NOTES
Assessment & Plan       ICD-10-CM    1. Benign skin lesion  L98.9 Surgical Pathology Exam     EXC BENIGN SKIN LESION TRUNK/ARM/LEG <=0.5 CM      2. Skin lesion of neck  L98.9 Surgical Pathology Exam     EXC BENIGN SKIN LESION SCLP/NCK/HNDS/FEET/GEN <=0.5 CM      3. Skin lesion of back  L98.9 DESTRUCT BENIGN LESION, UP TO 14        Routine wound care for the treatment sites   Return in 8 to 10 days for suture removal        Subjective     Medina Carvajal is a 38 year old female who presents to clinic today for the following health issues accompanied by her  none :    History of Present Illness       Reason for visit:  Remove a couple moles - emailed doc to confirm he can do this in office    She eats 2-3 servings of fruits and vegetables daily.She consumes 0 sweetened beverage(s) daily.She exercises with enough effort to increase her heart rate 30 to 60 minutes per day.  She exercises with enough effort to increase her heart rate 5 days per week.   She is taking medications regularly.     The patient comes in for evaluation of possible removal of some skin growths.  She has 1 on the lower right side of her neck that is raised and bothersome.  That is the main when she wants remote.  She also has a raised fleshy growth on her lower left abdomen as well.  It rubs on clothing at times and she wonders if that could be removed as well.  There is also a dry crusty skin lesion on her left mid back that sometimes itches.  She cannot see it very well, but she wonders if that could be removed as well.  She has no personal history of skin cancer, although she does have a family history of skin cancer on her paternal side.    Patient Active Problem List   Diagnosis    Mild recurrent major depression (H24)    EVELIN (generalized anxiety disorder)    Recurrent herpes labialis    Moderate persistent asthma without complication    Non-seasonal allergic rhinitis, unspecified trigger    Kidney stone    Seasonal allergic rhinitis due to  "pollen     Current Outpatient Medications   Medication Sig Dispense Refill    acyclovir (ZOVIRAX) 400 MG tablet TAKE 1 TABLET BY MOUTH TWICE A  tablet 3    albuterol (PROAIR RESPICLICK) 108 (90 Base) MCG/ACT inhaler Inhale 1-2 puffs into the lungs every 6 hours as needed for shortness of breath or wheezing As PRN 1 each 1    albuterol (PROVENTIL) (2.5 MG/3ML) 0.083% neb solution Take 1 vial (2.5 mg) by nebulization every 6 hours as needed for shortness of breath, wheezing or cough 90 mL 1    budesonide-formoterol (SYMBICORT) 160-4.5 MCG/ACT Inhaler INHALE 1-2 PUFF(S) BY MOUTH TWICE A DAY 10.2 g 5    busPIRone (BUSPAR) 15 MG tablet Take 1 tablet (15 mg) by mouth 2 times daily 180 tablet 3    escitalopram (LEXAPRO) 10 MG tablet Take 1 tablet (10 mg) by mouth daily 90 tablet 3     Current Facility-Administered Medications   Medication Dose Route Frequency Provider Last Rate Last Admin    3 mL bupivacaine (MARCAINE) preservative free injection 0.25% (10 mL vial)  3 mL   Usman Briceno MD   3 mL at 08/03/22 1036    sodium chloride (PF) 0.9% PF flush 3 mL  3 mL Intravenous q1 min prn Juan Acosta PA-C        triamcinolone (KENALOG-40) injection 40 mg  40 mg   Usman Briceno MD   40 mg at 08/03/22 1036         Review of Systems   Noncontributory except as above.      Objective    /73 (BP Location: Left arm, Patient Position: Sitting, Cuff Size: Adult Regular)   Pulse 67   Temp 98.4  F (36.9  C) (Oral)   Ht 1.793 m (5' 10.6\")   Wt 68.5 kg (151 lb)   LMP 06/01/2024   SpO2 100%   BMI 21.30 kg/m    Body mass index is 21.3 kg/m .  Physical Exam   GENERAL: alert and no distress  SKIN: She has a raised fleshy 3 to 4 mm diameter skin growth on the lower right side of her neck and another one on the lower left side of her abdomen.  Both of these look benign, likely intradermal nevi or perhaps compound nevi.  She also has a minimally raised yellowish colored three-quarter to 1 cm diameter skin lesion " on the left mid back that is slightly crusty/waxy.  This looks like it may be a seborrheic keratosis.  After discussion with the patient, this one was frozen with liquid nitrogen.    After discussion with the patient, including risks and benefits of the procedure, we elected to proceed with the removals of the skin lesions on her lower abdomen and neck.  Each skin lesion lesion was cleansed with alcohol and then anesthetized with 1% lidocaine with epinephrine.  An elliptical excision was made around each lesion so that the total width of each excision was approximately 4 to 5 mm and the length was roughly 1.2 cm.   The abdomen wound was then closed with three 4-0 nylon sutures through the skin and the neck wound was closed with three 5-0 nylon sutures.  Bacitracin and a sterile dressing were then applied to each wound.  The removed specimens were sent to pathology to confirm their benign nature.

## 2024-07-22 LAB
PATH REPORT.COMMENTS IMP SPEC: NORMAL
PATH REPORT.COMMENTS IMP SPEC: NORMAL
PATH REPORT.FINAL DX SPEC: NORMAL
PATH REPORT.GROSS SPEC: NORMAL
PATH REPORT.MICROSCOPIC SPEC OTHER STN: NORMAL
PATH REPORT.RELEVANT HX SPEC: NORMAL
PHOTO IMAGE: NORMAL

## 2024-07-22 NOTE — RESULT ENCOUNTER NOTE
Medina,  Both of your pathology lab results came back benign, as expected.  No sign of skin cancer or anything unusual with the skin growths.    Rolando Palacios MD

## 2024-07-26 ENCOUNTER — ALLIED HEALTH/NURSE VISIT (OUTPATIENT)
Dept: FAMILY MEDICINE | Facility: CLINIC | Age: 39
End: 2024-07-26
Payer: COMMERCIAL

## 2024-07-26 DIAGNOSIS — Z48.02 ENCOUNTER FOR REMOVAL OF SUTURES: Primary | ICD-10-CM

## 2024-07-26 PROCEDURE — 99207 PR NO CHARGE NURSE ONLY: CPT

## 2024-07-26 NOTE — PROGRESS NOTES
"Medina Carvajal presents to the clinic today for removal of sutures.  The patient has had the sutures in place for 8 days.  There has been no history of infection or drainage.  6 sutures are seen located on the abdomen (3 sutures) and neck (3 sutures).  The wound is healing well with no signs of infection.  Tetanus status is up to date.   All sutures were easily removed today.  Routine wound care discussed.  The patient will follow up as needed.     Per office visit 7/18/24:  \"After discussion with the patient, including risks and benefits of the procedure, we elected to proceed with the removals of the skin lesions on her lower abdomen and neck.  Each skin lesion lesion was cleansed with alcohol and then anesthetized with 1% lidocaine with epinephrine.  An elliptical excision was made around each lesion so that the total width of each excision was approximately 4 to 5 mm and the length was roughly 1.2 cm.   The abdomen wound was then closed with three 4-0 nylon sutures through the skin and the neck wound was closed with three 5-0 nylon sutures.  Bacitracin and a sterile dressing were then applied to each wound.  The removed specimens were sent to pathology to confirm their benign nature.\" -Dr. Rolando Palacios    Thanks,  ABA MenezesN DOMINIC  [facility]      "

## 2024-08-21 ASSESSMENT — ANXIETY QUESTIONNAIRES
3. WORRYING TOO MUCH ABOUT DIFFERENT THINGS: SEVERAL DAYS
7. FEELING AFRAID AS IF SOMETHING AWFUL MIGHT HAPPEN: MORE THAN HALF THE DAYS
GAD7 TOTAL SCORE: 7
GAD7 TOTAL SCORE: 7
6. BECOMING EASILY ANNOYED OR IRRITABLE: NOT AT ALL
7. FEELING AFRAID AS IF SOMETHING AWFUL MIGHT HAPPEN: MORE THAN HALF THE DAYS
4. TROUBLE RELAXING: SEVERAL DAYS
2. NOT BEING ABLE TO STOP OR CONTROL WORRYING: SEVERAL DAYS
5. BEING SO RESTLESS THAT IT IS HARD TO SIT STILL: SEVERAL DAYS
IF YOU CHECKED OFF ANY PROBLEMS ON THIS QUESTIONNAIRE, HOW DIFFICULT HAVE THESE PROBLEMS MADE IT FOR YOU TO DO YOUR WORK, TAKE CARE OF THINGS AT HOME, OR GET ALONG WITH OTHER PEOPLE: SOMEWHAT DIFFICULT
1. FEELING NERVOUS, ANXIOUS, OR ON EDGE: SEVERAL DAYS
GAD7 TOTAL SCORE: 7
8. IF YOU CHECKED OFF ANY PROBLEMS, HOW DIFFICULT HAVE THESE MADE IT FOR YOU TO DO YOUR WORK, TAKE CARE OF THINGS AT HOME, OR GET ALONG WITH OTHER PEOPLE?: SOMEWHAT DIFFICULT

## 2024-08-26 ENCOUNTER — OFFICE VISIT (OUTPATIENT)
Dept: FAMILY MEDICINE | Facility: CLINIC | Age: 39
End: 2024-08-26
Payer: COMMERCIAL

## 2024-08-26 VITALS
HEIGHT: 71 IN | BODY MASS INDEX: 21.42 KG/M2 | DIASTOLIC BLOOD PRESSURE: 71 MMHG | OXYGEN SATURATION: 100 % | SYSTOLIC BLOOD PRESSURE: 108 MMHG | WEIGHT: 153 LBS | HEART RATE: 57 BPM | TEMPERATURE: 98.9 F | RESPIRATION RATE: 16 BRPM

## 2024-08-26 DIAGNOSIS — F33.0 MILD RECURRENT MAJOR DEPRESSION (H): ICD-10-CM

## 2024-08-26 DIAGNOSIS — F41.1 GAD (GENERALIZED ANXIETY DISORDER): ICD-10-CM

## 2024-08-26 PROCEDURE — 99213 OFFICE O/P EST LOW 20 MIN: CPT | Performed by: FAMILY MEDICINE

## 2024-08-26 RX ORDER — BUPROPION HYDROCHLORIDE 300 MG/1
300 TABLET ORAL EVERY MORNING
Qty: 30 TABLET | Refills: 1 | Status: SHIPPED | OUTPATIENT
Start: 2024-08-26 | End: 2024-10-02

## 2024-08-26 ASSESSMENT — PATIENT HEALTH QUESTIONNAIRE - PHQ9: SUM OF ALL RESPONSES TO PHQ QUESTIONS 1-9: 6

## 2024-08-26 ASSESSMENT — PAIN SCALES - GENERAL: PAINLEVEL: NO PAIN (0)

## 2024-08-26 NOTE — PROGRESS NOTES
Assessment & Plan       ICD-10-CM    1. EVELIN (generalized anxiety disorder)  F41.1 buPROPion (WELLBUTRIN XL) 300 MG 24 hr tablet      2. Mild recurrent major depression (H24)  F33.0 buPROPion (WELLBUTRIN XL) 300 MG 24 hr tablet        We discussed options for her anxiety and depression and elected to have her stay on the same buspirone, but wean off the Lexapro by taking 1/2 tablet daily for one 1 week and then stopping it  At the same time, we will start her on bupropion  mg daily  Discussed that it will take a while for the bupropion to build up in her system and that the bupropion medication is generally better for depression than anxiety, but she still will be taking the buspirone to help with anxiety, and the bupropion should not cause sexual side effects for her like the SSRI did  I advised a recheck in 6 weeks      Taye Haney is a 38 year old, presenting for the following health issues:  Depression      8/26/2024    11:12 AM   Additional Questions   Roomed by cam   Accompanied by wandae         8/26/2024    11:12 AM   Patient Reported Additional Medications   Patient reports taking the following new medications none     History of Present Illness       Mental Health Follow-up:  Patient presents to follow-up on Depression & Anxiety.Patient's depression since last visit has been:  Bad  The patient is having other symptoms associated with depression.  Patient's anxiety since last visit has been:  Medium  The patient is not having other symptoms associated with anxiety.  Any significant life events: No  Patient is not feeling anxious or having panic attacks.  Patient has no concerns about alcohol or drug use.    She eats 2-3 servings of fruits and vegetables daily.She consumes 0 sweetened beverage(s) daily.She exercises with enough effort to increase her heart rate 30 to 60 minutes per day.  She exercises with enough effort to increase her heart rate 5 days per week.   She is taking medications  regularly.     She was started on Lexapro 5 mg daily a number of years ago for anxiety and depression.  She felt like the medicine was very helpful initially.  Eventually she felt like the 5 mg dose was not as helpful, so a few years ago it was increased to the 10 mg dose.  She felt like that medicine did well for the first 2 to 3 years, but in the last 6 months or so she feels like she is having a bit more anxiety and depression.  She feels like she is having some brain fog.  She has had a little more weight gain than desirable.  She is also has some sexual side effects, mainly decreased libido.  Her  takes Wellbutrin and does well with that medicine.  She wondered about trying that one.  She is still taking buspirone for anxiety and feels like that is working okay.  She does feel like she has a little trouble with her focus at times.  See answers to PHQ-9 and EVELIN-7 questionnaires for other details.      Patient Active Problem List   Diagnosis    Mild recurrent major depression (H24)    EVELIN (generalized anxiety disorder)    Recurrent herpes labialis    Moderate persistent asthma without complication    Non-seasonal allergic rhinitis, unspecified trigger    Kidney stone    Seasonal allergic rhinitis due to pollen     Current Outpatient Medications   Medication Sig Dispense Refill    acyclovir (ZOVIRAX) 400 MG tablet TAKE 1 TABLET BY MOUTH TWICE A  tablet 3    albuterol (PROAIR RESPICLICK) 108 (90 Base) MCG/ACT inhaler Inhale 1-2 puffs into the lungs every 6 hours as needed for shortness of breath or wheezing As PRN 1 each 1    albuterol (PROVENTIL) (2.5 MG/3ML) 0.083% neb solution Take 1 vial (2.5 mg) by nebulization every 6 hours as needed for shortness of breath, wheezing or cough 90 mL 1    budesonide-formoterol (SYMBICORT) 160-4.5 MCG/ACT Inhaler INHALE 1-2 PUFF(S) BY MOUTH TWICE A DAY 10.2 g 5    busPIRone (BUSPAR) 15 MG tablet Take 1 tablet (15 mg) by mouth 2 times daily 180 tablet 3     "escitalopram (LEXAPRO) 10 MG tablet Take 1 tablet (10 mg) by mouth daily 90 tablet 3     Current Facility-Administered Medications   Medication Dose Route Frequency Provider Last Rate Last Admin    3 mL bupivacaine (MARCAINE) preservative free injection 0.25% (10 mL vial)  3 mL   Usman Briceno MD   3 mL at 08/03/22 1036    sodium chloride (PF) 0.9% PF flush 3 mL  3 mL Intravenous q1 min prn Juan Acosta PA-C        triamcinolone (KENALOG-40) injection 40 mg  40 mg   Usman Briceno MD   40 mg at 08/03/22 1036           Review of Systems  Her skin removal sites are healing up well.  See previous note on that.      Objective    /71 (BP Location: Right arm, Patient Position: Sitting, Cuff Size: Adult Regular)   Pulse 57   Temp 98.9  F (37.2  C) (Temporal)   Resp 16   Ht 1.793 m (5' 10.6\")   Wt 69.4 kg (153 lb)   LMP 08/25/2024 (Exact Date)   SpO2 100%   BMI 21.58 kg/m    Body mass index is 21.58 kg/m .  Physical Exam   GENERAL: alert and no distress  SKIN: no suspicious lesions or rashes.  Skin removal sites are healing up fine without any sign of infection.  PSYCH: mentation appears normal, affect normal/bright.  She scored a 6 on her PHQ-9 and a 7 on her EVELIN-7 questionnaires as per her chart            Signed Electronically by: Rolando Palcaios MD    "

## 2024-09-08 ENCOUNTER — MYC MEDICAL ADVICE (OUTPATIENT)
Dept: FAMILY MEDICINE | Facility: CLINIC | Age: 39
End: 2024-09-08
Payer: COMMERCIAL

## 2024-09-08 DIAGNOSIS — F33.0 MILD RECURRENT MAJOR DEPRESSION (H): Primary | ICD-10-CM

## 2024-09-12 RX ORDER — BUPROPION HYDROCHLORIDE 150 MG/1
150 TABLET ORAL EVERY MORNING
Qty: 30 TABLET | Refills: 0 | Status: SHIPPED | OUTPATIENT
Start: 2024-09-12 | End: 2024-10-02

## 2024-09-20 DIAGNOSIS — F41.1 GAD (GENERALIZED ANXIETY DISORDER): ICD-10-CM

## 2024-09-20 DIAGNOSIS — F33.0 MILD RECURRENT MAJOR DEPRESSION (H): ICD-10-CM

## 2024-09-20 RX ORDER — BUPROPION HYDROCHLORIDE 300 MG/1
300 TABLET ORAL EVERY MORNING
Qty: 30 TABLET | Refills: 1 | OUTPATIENT
Start: 2024-09-20

## 2024-10-01 ENCOUNTER — MYC MEDICAL ADVICE (OUTPATIENT)
Dept: FAMILY MEDICINE | Facility: CLINIC | Age: 39
End: 2024-10-01
Payer: COMMERCIAL

## 2024-10-01 DIAGNOSIS — F33.0 MILD RECURRENT MAJOR DEPRESSION (H): ICD-10-CM

## 2024-10-02 RX ORDER — BUPROPION HYDROCHLORIDE 150 MG/1
150 TABLET ORAL EVERY MORNING
Qty: 30 TABLET | Refills: 2 | Status: SHIPPED | OUTPATIENT
Start: 2024-10-02 | End: 2024-11-06

## 2024-10-28 ENCOUNTER — MYC MEDICAL ADVICE (OUTPATIENT)
Dept: FAMILY MEDICINE | Facility: CLINIC | Age: 39
End: 2024-10-28
Payer: COMMERCIAL

## 2024-10-28 DIAGNOSIS — F33.0 MILD RECURRENT MAJOR DEPRESSION (H): ICD-10-CM

## 2024-10-28 DIAGNOSIS — F41.1 GAD (GENERALIZED ANXIETY DISORDER): Primary | ICD-10-CM

## 2024-10-29 ENCOUNTER — TELEPHONE (OUTPATIENT)
Dept: FAMILY MEDICINE | Facility: CLINIC | Age: 39
End: 2024-10-29
Payer: COMMERCIAL

## 2024-10-29 DIAGNOSIS — F41.1 GAD (GENERALIZED ANXIETY DISORDER): ICD-10-CM

## 2024-10-29 DIAGNOSIS — F33.0 MILD RECURRENT MAJOR DEPRESSION (H): ICD-10-CM

## 2024-10-29 RX ORDER — ESCITALOPRAM OXALATE 5 MG/1
TABLET ORAL
Qty: 15 TABLET | Refills: 1 | Status: SHIPPED | OUTPATIENT
Start: 2024-10-29 | End: 2024-10-30

## 2024-10-29 NOTE — TELEPHONE ENCOUNTER
Pharmacy requesting clarification regarding escitalopram 5 mg daily dose or taper instructions.  Cira Ambriz CMA

## 2024-10-30 RX ORDER — ESCITALOPRAM OXALATE 5 MG/1
TABLET ORAL
Qty: 15 TABLET | Refills: 1 | Status: SHIPPED | OUTPATIENT
Start: 2024-10-30 | End: 2024-11-06

## 2024-10-30 NOTE — TELEPHONE ENCOUNTER
Pharmacy needs instructions on: escitalopram (LEXAPRO) 5 MG tablet     Please send through new rx with direct instructions    ANDREW Adler RN  [facility]

## 2024-10-30 NOTE — TELEPHONE ENCOUNTER
"Dr. Palacios,     Per pharmacist they need to have some kind of directions due to insurance. Per pharmacist the signature can state: \"One very other day or as directed\".     Thanks,  DOMINIC Moreno  [facility]     "

## 2024-10-30 NOTE — TELEPHONE ENCOUNTER
There is not a defined schedule for this.  She will do it gradually over the next few weeks according to how she is feeling.  She is aware of the plan.  Please have them just fill the prescription for her.    Rolando Palacios MD

## 2024-11-04 ASSESSMENT — ANXIETY QUESTIONNAIRES
4. TROUBLE RELAXING: NOT AT ALL
GAD7 TOTAL SCORE: 2
7. FEELING AFRAID AS IF SOMETHING AWFUL MIGHT HAPPEN: NOT AT ALL
IF YOU CHECKED OFF ANY PROBLEMS ON THIS QUESTIONNAIRE, HOW DIFFICULT HAVE THESE PROBLEMS MADE IT FOR YOU TO DO YOUR WORK, TAKE CARE OF THINGS AT HOME, OR GET ALONG WITH OTHER PEOPLE: NOT DIFFICULT AT ALL
2. NOT BEING ABLE TO STOP OR CONTROL WORRYING: SEVERAL DAYS
8. IF YOU CHECKED OFF ANY PROBLEMS, HOW DIFFICULT HAVE THESE MADE IT FOR YOU TO DO YOUR WORK, TAKE CARE OF THINGS AT HOME, OR GET ALONG WITH OTHER PEOPLE?: NOT DIFFICULT AT ALL
7. FEELING AFRAID AS IF SOMETHING AWFUL MIGHT HAPPEN: NOT AT ALL
GAD7 TOTAL SCORE: 2
5. BEING SO RESTLESS THAT IT IS HARD TO SIT STILL: NOT AT ALL
1. FEELING NERVOUS, ANXIOUS, OR ON EDGE: NOT AT ALL
3. WORRYING TOO MUCH ABOUT DIFFERENT THINGS: SEVERAL DAYS
6. BECOMING EASILY ANNOYED OR IRRITABLE: NOT AT ALL
GAD7 TOTAL SCORE: 2

## 2024-11-06 ENCOUNTER — VIRTUAL VISIT (OUTPATIENT)
Dept: FAMILY MEDICINE | Facility: CLINIC | Age: 39
End: 2024-11-06
Payer: COMMERCIAL

## 2024-11-06 DIAGNOSIS — F33.0 MILD RECURRENT MAJOR DEPRESSION (H): ICD-10-CM

## 2024-11-06 DIAGNOSIS — F41.1 GAD (GENERALIZED ANXIETY DISORDER): ICD-10-CM

## 2024-11-06 PROCEDURE — 99213 OFFICE O/P EST LOW 20 MIN: CPT | Mod: 95 | Performed by: FAMILY MEDICINE

## 2024-11-06 RX ORDER — BUPROPION HYDROCHLORIDE 150 MG/1
150 TABLET ORAL EVERY MORNING
Qty: 90 TABLET | Refills: 1 | Status: SHIPPED | OUTPATIENT
Start: 2024-11-06

## 2024-11-06 RX ORDER — ESCITALOPRAM OXALATE 5 MG/1
TABLET ORAL
Qty: 15 TABLET | Refills: 5 | Status: SHIPPED | OUTPATIENT
Start: 2024-11-06

## 2024-11-06 ASSESSMENT — PATIENT HEALTH QUESTIONNAIRE - PHQ9
10. IF YOU CHECKED OFF ANY PROBLEMS, HOW DIFFICULT HAVE THESE PROBLEMS MADE IT FOR YOU TO DO YOUR WORK, TAKE CARE OF THINGS AT HOME, OR GET ALONG WITH OTHER PEOPLE: NOT DIFFICULT AT ALL
SUM OF ALL RESPONSES TO PHQ QUESTIONS 1-9: 7
SUM OF ALL RESPONSES TO PHQ QUESTIONS 1-9: 3
SUM OF ALL RESPONSES TO PHQ QUESTIONS 1-9: 7

## 2024-11-06 NOTE — PROGRESS NOTES
Medina is a 39 year old who is being evaluated via a billable video visit.    How would you like to obtain your AVS? MyChart  If the video visit is dropped, the invitation should be resent by: Text to cell phone: [phone]  Will anyone else be joining your video visit? No      Assessment & Plan       ICD-10-CM    1. Mild recurrent major depression (H)  F33.0 buPROPion (WELLBUTRIN XL) 150 MG 24 hr tablet     escitalopram (LEXAPRO) 5 MG tablet      2. EVELIN (generalized anxiety disorder)  F41.1 escitalopram (LEXAPRO) 5 MG tablet        She is doing well with the buspirone and bupropion, so we will continue those same medications and dosing  We discussed her slow wean of the escitalopram and she will continue to try to eventually get off that medicine, but I will allow her to taper the medicine as she sees fit  Plan a tentative recheck in 6 months with an annual physical, or sooner prn        Subjective   Medina is a 39 year old, presenting for the following health issues:  Anxiety and Depression        11/6/2024     2:23 PM   Additional Questions   Roomed by Charo     History of Present Illness       Mental Health Follow-up:  Patient presents to follow-up on Depression & Anxiety.Patient's depression since last visit has been:  Good  The patient is not having other symptoms associated with depression.  Patient's anxiety since last visit has been:  Better  The patient is not having other symptoms associated with anxiety.  Any significant life events: No  Patient is not feeling anxious or having panic attacks.  Patient has no concerns about alcohol or drug use.    She eats 2-3 servings of fruits and vegetables daily.She consumes 0 sweetened beverage(s) daily.She exercises with enough effort to increase her heart rate 30 to 60 minutes per day.  She exercises with enough effort to increase her heart rate 5 days per week.      She is currently taking buspirone, primarily for anxiety, and bupropion, primarily for  depression, and feels like these medicines are working well.  She feels like her mental health is doing quite well and she does not feel especially anxious or depressed.  She is trying to wean off the escitalopram, however, that has been a challenging process.  She has had nausea and poor appetite when she tries to decrease the dose.  She has lost some weight.  She is currently taking about three quarters of a tablet of the 5 mg dose.  See other notes and messages in chart regarding this.    See answers to PHQ-9 and EVELIN 7 questions in chart as well.    Patient Active Problem List   Diagnosis    Mild recurrent major depression (H)    EVELIN (generalized anxiety disorder)    Recurrent herpes labialis    Moderate persistent asthma without complication    Non-seasonal allergic rhinitis, unspecified trigger    Kidney stone    Seasonal allergic rhinitis due to pollen     Current Outpatient Medications   Medication Sig Dispense Refill    acyclovir (ZOVIRAX) 400 MG tablet TAKE 1 TABLET BY MOUTH TWICE A  tablet 3    albuterol (PROAIR RESPICLICK) 108 (90 Base) MCG/ACT inhaler Inhale 1-2 puffs into the lungs every 6 hours as needed for shortness of breath or wheezing As PRN 1 each 1    albuterol (PROVENTIL) (2.5 MG/3ML) 0.083% neb solution Take 1 vial (2.5 mg) by nebulization every 6 hours as needed for shortness of breath, wheezing or cough 90 mL 1    budesonide-formoterol (SYMBICORT) 160-4.5 MCG/ACT Inhaler INHALE 1-2 PUFF(S) BY MOUTH TWICE A DAY 10.2 g 5    buPROPion (WELLBUTRIN XL) 150 MG 24 hr tablet Take 1 tablet (150 mg) by mouth every morning. 90 tablet 1    busPIRone (BUSPAR) 15 MG tablet Take 1 tablet (15 mg) by mouth 2 times daily 180 tablet 3    escitalopram (LEXAPRO) 5 MG tablet One tablet every day or as directed by provider. 15 tablet 5     Current Facility-Administered Medications   Medication Dose Route Frequency Provider Last Rate Last Admin    3 mL bupivacaine (MARCAINE) preservative free injection 0.25%  (10 mL vial)  3 mL   Usman Briceno MD   3 mL at 08/03/22 1036    sodium chloride (PF) 0.9% PF flush 3 mL  3 mL Intravenous q1 min prn Juan Acosta PA-C        triamcinolone (KENALOG-40) injection 40 mg  40 mg   Usman Briceno MD   40 mg at 08/03/22 1036             Review of Systems  Noncontributory except as above.      Objective           Vitals:  No vitals were obtained today due to virtual visit.    Physical Exam   GENERAL: alert and no distress  EYES: Eyes grossly normal to inspection.  No discharge or erythema, or obvious scleral/conjunctival abnormalities.  RESP: No audible wheeze, cough, or visible cyanosis.    SKIN: Visible skin clear. No significant rash, abnormal pigmentation or lesions.  NEURO: Cranial nerves grossly intact.  Mentation and speech appropriate for age.  PSYCH: Appropriate affect, tone, and pace of words    She scored a 7 on her PHQ-9 today and the 2 on her EVELIN-7.      Video-Visit Details    Type of service:  Video Visit   Originating Location (pt. Location): Home    Distant Location (provider location):  On-site  Platform used for Video Visit: Clemente  Signed Electronically by: Rolando Palacios MD

## 2024-12-02 ENCOUNTER — MYC MEDICAL ADVICE (OUTPATIENT)
Dept: FAMILY MEDICINE | Facility: CLINIC | Age: 39
End: 2024-12-02
Payer: COMMERCIAL

## 2024-12-05 ENCOUNTER — VIRTUAL VISIT (OUTPATIENT)
Dept: FAMILY MEDICINE | Facility: CLINIC | Age: 39
End: 2024-12-05
Payer: COMMERCIAL

## 2024-12-05 DIAGNOSIS — R11.0 NAUSEA: ICD-10-CM

## 2024-12-05 DIAGNOSIS — F33.0 MILD RECURRENT MAJOR DEPRESSION (H): Primary | ICD-10-CM

## 2024-12-05 DIAGNOSIS — F41.1 GAD (GENERALIZED ANXIETY DISORDER): ICD-10-CM

## 2024-12-05 RX ORDER — VENLAFAXINE HYDROCHLORIDE 37.5 MG/1
CAPSULE, EXTENDED RELEASE ORAL
Qty: 60 CAPSULE | Refills: 1 | Status: SHIPPED | OUTPATIENT
Start: 2024-12-05

## 2024-12-05 RX ORDER — ONDANSETRON 4 MG/1
4 TABLET, FILM COATED ORAL EVERY 8 HOURS PRN
Qty: 25 TABLET | Refills: 1 | Status: SHIPPED | OUTPATIENT
Start: 2024-12-05

## 2024-12-05 ASSESSMENT — ANXIETY QUESTIONNAIRES
6. BECOMING EASILY ANNOYED OR IRRITABLE: MORE THAN HALF THE DAYS
3. WORRYING TOO MUCH ABOUT DIFFERENT THINGS: SEVERAL DAYS
8. IF YOU CHECKED OFF ANY PROBLEMS, HOW DIFFICULT HAVE THESE MADE IT FOR YOU TO DO YOUR WORK, TAKE CARE OF THINGS AT HOME, OR GET ALONG WITH OTHER PEOPLE?: SOMEWHAT DIFFICULT
7. FEELING AFRAID AS IF SOMETHING AWFUL MIGHT HAPPEN: NOT AT ALL
1. FEELING NERVOUS, ANXIOUS, OR ON EDGE: SEVERAL DAYS
GAD7 TOTAL SCORE: 5
5. BEING SO RESTLESS THAT IT IS HARD TO SIT STILL: SEVERAL DAYS
GAD7 TOTAL SCORE: 5
4. TROUBLE RELAXING: NOT AT ALL
GAD7 TOTAL SCORE: 5
2. NOT BEING ABLE TO STOP OR CONTROL WORRYING: NOT AT ALL
7. FEELING AFRAID AS IF SOMETHING AWFUL MIGHT HAPPEN: NOT AT ALL
IF YOU CHECKED OFF ANY PROBLEMS ON THIS QUESTIONNAIRE, HOW DIFFICULT HAVE THESE PROBLEMS MADE IT FOR YOU TO DO YOUR WORK, TAKE CARE OF THINGS AT HOME, OR GET ALONG WITH OTHER PEOPLE: SOMEWHAT DIFFICULT

## 2024-12-05 NOTE — PROGRESS NOTES
"  If patient has telephone visit, have they been educated on video visit as preferred visit method and offered to change to video visit? N/A        Instructions Relayed to Patient by Virtual Roomer:     Patient is active on [employer]:   Relayed following to patient: \"It looks like you are active on [employer], are you able to join the visit this way? If not, do you need us to send you a link now or would you like your provider to send a link via text or email when they are ready to initiate the visit?\"      Patient Confirmed they will join visit via: [employer]  Reminded patient to ensure they were logged on to virtual visit by arrival time listed.   Asked if patient has flexibility to initiate visit sooner than arrival time: patient is unable to initiate visit earlier than arrival time Medina is a 39 year old who is being evaluated via a billable video visit.    How would you like to obtain your AVS? [employer]  If the video visit is dropped, the invitation should be resent by: Text to cell phone: [phone]  Will anyone else be joining your video visit? No      Assessment & Plan       ICD-10-CM    1. Mild recurrent major depression (H)  F33.0 venlafaxine (EFFEXOR XR) 37.5 MG 24 hr capsule      2. EVELIN (generalized anxiety disorder)  F41.1 venlafaxine (EFFEXOR XR) 37.5 MG 24 hr capsule      3. Nausea  R11.0 ondansetron (ZOFRAN) 4 MG tablet        We discussed various options and I recommended we try her on an SNRI, specifically Effexor  She is going to be traveling to [place] on this next week and wants to wait to do any medication changes until she returns, so she will stay on the low-dose Lexapro at 2.5 mg daily and stay off the Wellbutrin  I will prescribe Zofran for her to use on an as-needed basis for any nausea in the meantime  When she returns on the 16th, I suggest that she start Effexor 37.5 mg daily for 1 week and then take 2 capsules daily thereafter  After being on the Effexor for a few days, then stop the " Lexapro  I advised a follow-up visit with me in about 6 weeks or so, either in person or by video        Subjective   Medina is a 39 year old, presenting for the following health issues:  Medication Problem (Wellbutrin last taken roughly around 11/25/24)        12/5/2024     3:29 PM   Additional Questions   Roomed by Crystal   Accompanied by self         12/5/2024     3:29 PM   Patient Reported Additional Medications   Patient reports taking the following new medications none     History of Present Illness       Mental Health Follow-up:  Patient presents to follow-up on Depression & Anxiety.Patient's depression since last visit has been:  Worse  The patient is having other symptoms associated with depression.  Patient's anxiety since last visit has been:  No change  The patient is not having other symptoms associated with anxiety.  Any significant life events: No  Patient is not feeling anxious or having panic attacks.  Patient has no concerns about alcohol or drug use.    She eats 0-1 servings of fruits and vegetables daily.She consumes 0 sweetened beverage(s) daily.She exercises with enough effort to increase her heart rate 20 to 29 minutes per day.  She exercises with enough effort to increase her heart rate 4 days per week.   She is taking medications regularly.       Medication Followup of Lexapro and Wellbutrin  Taking Medication as prescribed: Lexapro yes 2.5mg and stopped Wellbutrin 11/25/24  Side Effects:  None  Medication Helping Symptoms:  yes    She has been having a fair amount of nausea and poor appetite and GI upset.  She was taking Wellbutrin 150 mg daily and stop that a week and a half ago and those GI symptoms have improved somewhat.  She is still taking low-dose Lexapro 2.5 mg daily.  She has had a hard time getting off of that medication.  She did feel like her focus and attention were quite a bit better on the Wellbutrin.  She is wondering if there is any other medicine she could take to help  with her focus and depression and anxiety.  She remains on BuSpar at 1 to 2 tablets/day.  She feels like her anxiety is a bit more of a challenge now than depression.    She is still seeing a therapist.  Her therapist suggested she try to get some Zofran for her nausea.    Patient Active Problem List   Diagnosis    Mild recurrent major depression (H)    EVELIN (generalized anxiety disorder)    Recurrent herpes labialis    Moderate persistent asthma without complication    Non-seasonal allergic rhinitis, unspecified trigger    Kidney stone    Seasonal allergic rhinitis due to pollen     Current Outpatient Medications   Medication Sig Dispense Refill    acyclovir (ZOVIRAX) 400 MG tablet TAKE 1 TABLET BY MOUTH TWICE A  tablet 3    albuterol (PROAIR RESPICLICK) 108 (90 Base) MCG/ACT inhaler Inhale 1-2 puffs into the lungs every 6 hours as needed for shortness of breath or wheezing As PRN 1 each 1    albuterol (PROVENTIL) (2.5 MG/3ML) 0.083% neb solution Take 1 vial (2.5 mg) by nebulization every 6 hours as needed for shortness of breath, wheezing or cough 90 mL 1    budesonide-formoterol (SYMBICORT) 160-4.5 MCG/ACT Inhaler INHALE 1-2 PUFF(S) BY MOUTH TWICE A DAY 10.2 g 5    busPIRone (BUSPAR) 15 MG tablet Take 1 tablet (15 mg) by mouth 2 times daily 180 tablet 3    escitalopram (LEXAPRO) 5 MG tablet One tablet every day or as directed by provider. (Patient taking differently: Take 2.5 mg by mouth daily. half tablet every day or as directed by provider.) 15 tablet 5    buPROPion (WELLBUTRIN XL) 150 MG 24 hr tablet Take 1 tablet (150 mg) by mouth every morning. (Patient not taking: Reported on 12/5/2024) 90 tablet 1     Current Facility-Administered Medications   Medication Dose Route Frequency Provider Last Rate Last Admin    3 mL bupivacaine (MARCAINE) preservative free injection 0.25% (10 mL vial)  3 mL   Usman Briceno MD   3 mL at 08/03/22 1036    sodium chloride (PF) 0.9% PF flush 3 mL  3 mL Intravenous q1  min Juan Hook PA-C        triamcinolone (KENALOG-40) injection 40 mg  40 mg   Usman Briceno MD   40 mg at 08/03/22 1036           Review of Systems  Noncontributory except as above.      Objective    Vitals - Patient Reported  Pain Score: No Pain (0)        Physical Exam   GENERAL: alert and no distress  EYES: Eyes grossly normal to inspection.  No discharge or erythema, or obvious scleral/conjunctival abnormalities.  RESP: No audible wheeze, cough, or visible cyanosis.    SKIN: Visible skin clear. No significant rash, abnormal pigmentation or lesions.  NEURO: Cranial nerves grossly intact.  Mentation and speech appropriate for age.  PSYCH: Appropriate affect, tone, and pace of words    She scored a 5 on her EVELIN-7, and a 7 last month on her PHQ-9      Video-Visit Details    Type of service:  Video Visit   Originating Location (pt. Location): Home    Distant Location (provider location):  On-site  Platform used for Video Visit: Clemente  Signed Electronically by: Rolando Palacios MD      If pediatric virtual visit, ensured pediatric patient along with parent/guardian will be present for video visit.     Patient offered the website [email or web address] and/or phone number to [email or web address] Help line: [phone]

## 2024-12-09 ENCOUNTER — MYC MEDICAL ADVICE (OUTPATIENT)
Dept: FAMILY MEDICINE | Facility: CLINIC | Age: 39
End: 2024-12-09
Payer: COMMERCIAL

## 2024-12-09 DIAGNOSIS — F41.1 GAD (GENERALIZED ANXIETY DISORDER): Primary | ICD-10-CM

## 2024-12-09 RX ORDER — HYDROXYZINE HYDROCHLORIDE 25 MG/1
25 TABLET, FILM COATED ORAL 3 TIMES DAILY PRN
Qty: 30 TABLET | Refills: 1 | Status: SHIPPED | OUTPATIENT
Start: 2024-12-09

## 2024-12-09 NOTE — TELEPHONE ENCOUNTER
Attempted to call patient. Left vm asking please call back and talk to nursing staff about recent mychart message.     Thanks,  DOMINIC Moreno  [facility]

## 2024-12-09 NOTE — TELEPHONE ENCOUNTER
Dr. Palacios,     See mychart please.     Patient returned call. Denies suicidal ideation and denying plan in place for suicide. Per patient struggling most with irritability and feeling overwhelmed and negative thoughts about her self when it comes to friendships. She feels like she is not good enough for people to like her but she does not feel so strong about this to lead to SI.  She has therapy tomorrow and has been going to the gym after work daily to help improve her mental health.     Patient is taking her venlafaxine and buspirone but asking if there is anything she can take PRN for when she feels highly irritable or has racing thoughts.     Would hydroxyzine be a good option?    Patient is okay with team laving detailed voice mail.    Thanks,  DOMINIC Moreno  [facility]

## 2024-12-30 ENCOUNTER — MYC MEDICAL ADVICE (OUTPATIENT)
Dept: FAMILY MEDICINE | Facility: CLINIC | Age: 39
End: 2024-12-30
Payer: COMMERCIAL

## 2024-12-30 DIAGNOSIS — F41.1 GAD (GENERALIZED ANXIETY DISORDER): Primary | ICD-10-CM

## 2024-12-30 DIAGNOSIS — F33.0 MILD RECURRENT MAJOR DEPRESSION: ICD-10-CM

## 2025-01-09 RX ORDER — VENLAFAXINE HYDROCHLORIDE 75 MG/1
75 CAPSULE, EXTENDED RELEASE ORAL DAILY
Qty: 90 CAPSULE | Refills: 0 | Status: SHIPPED | OUTPATIENT
Start: 2025-01-09

## 2025-04-02 ENCOUNTER — OFFICE VISIT (OUTPATIENT)
Dept: FAMILY MEDICINE | Facility: CLINIC | Age: 40
End: 2025-04-02
Payer: COMMERCIAL

## 2025-04-02 VITALS
SYSTOLIC BLOOD PRESSURE: 102 MMHG | TEMPERATURE: 100.8 F | OXYGEN SATURATION: 99 % | RESPIRATION RATE: 19 BRPM | HEART RATE: 68 BPM | WEIGHT: 147 LBS | DIASTOLIC BLOOD PRESSURE: 72 MMHG | BODY MASS INDEX: 20.74 KG/M2

## 2025-04-02 DIAGNOSIS — F41.1 GAD (GENERALIZED ANXIETY DISORDER): ICD-10-CM

## 2025-04-02 DIAGNOSIS — B07.0 PLANTAR WARTS: Primary | ICD-10-CM

## 2025-04-02 DIAGNOSIS — L29.9 EAR ITCHING: ICD-10-CM

## 2025-04-02 DIAGNOSIS — J30.89 NON-SEASONAL ALLERGIC RHINITIS, UNSPECIFIED TRIGGER: ICD-10-CM

## 2025-04-02 DIAGNOSIS — F33.0 MILD RECURRENT MAJOR DEPRESSION: ICD-10-CM

## 2025-04-02 DIAGNOSIS — J45.40 MODERATE PERSISTENT ASTHMA WITHOUT COMPLICATION: ICD-10-CM

## 2025-04-02 DIAGNOSIS — L84 CALLUS OF FOOT: ICD-10-CM

## 2025-04-02 PROCEDURE — 3078F DIAST BP <80 MM HG: CPT | Performed by: FAMILY MEDICINE

## 2025-04-02 PROCEDURE — 3074F SYST BP LT 130 MM HG: CPT | Performed by: FAMILY MEDICINE

## 2025-04-02 PROCEDURE — 17110 DESTRUCTION B9 LES UP TO 14: CPT | Performed by: FAMILY MEDICINE

## 2025-04-02 PROCEDURE — 99214 OFFICE O/P EST MOD 30 MIN: CPT | Performed by: FAMILY MEDICINE

## 2025-04-02 PROCEDURE — 96127 BRIEF EMOTIONAL/BEHAV ASSMT: CPT | Performed by: FAMILY MEDICINE

## 2025-04-02 PROCEDURE — 1126F AMNT PAIN NOTED NONE PRSNT: CPT | Performed by: FAMILY MEDICINE

## 2025-04-02 RX ORDER — MONTELUKAST SODIUM 10 MG/1
10 TABLET ORAL AT BEDTIME
Qty: 90 TABLET | Refills: 3 | Status: SHIPPED | OUTPATIENT
Start: 2025-04-02

## 2025-04-02 ASSESSMENT — PATIENT HEALTH QUESTIONNAIRE - PHQ9
SUM OF ALL RESPONSES TO PHQ QUESTIONS 1-9: 2
SUM OF ALL RESPONSES TO PHQ QUESTIONS 1-9: 2
10. IF YOU CHECKED OFF ANY PROBLEMS, HOW DIFFICULT HAVE THESE PROBLEMS MADE IT FOR YOU TO DO YOUR WORK, TAKE CARE OF THINGS AT HOME, OR GET ALONG WITH OTHER PEOPLE: SOMEWHAT DIFFICULT

## 2025-04-02 ASSESSMENT — PAIN SCALES - GENERAL: PAINLEVEL_OUTOF10: NO PAIN (0)

## 2025-04-02 NOTE — PROGRESS NOTES
Assessment & Plan       ICD-10-CM    1. Plantar warts  B07.0 DESTRUCT BENIGN LESION, UP TO 14      2. Callus of foot  L84       3. Ear itching  L29.9       4. Non-seasonal allergic rhinitis, unspecified trigger  J30.89 montelukast (SINGULAIR) 10 MG tablet      5. Moderate persistent asthma without complication  J45.40 montelukast (SINGULAIR) 10 MG tablet      6. Mild recurrent major depression  F33.0       7. EVELIN (generalized anxiety disorder)  F41.1         We will see if the cryotherapy today clears her right foot plantar warts, but discussed that sometimes further treatment is required  Conservative care for the left foot callus  I advised her to avoid using Q-tips or other instruments inside her ear canals  Use a small of 1% hydrocortisone cream on the fingertip if need be  We will add montelukast 10 mg nightly to help with her allergies and asthma  Continue same mental health medications  Plan a recheck at the end of May for an annual physical and follow-up on the above      The longitudinal plan of care for the diagnosis(es)/condition(s) as documented were addressed during this visit. Due to the added complexity in care, I will continue to support Medina in the subsequent management and with ongoing continuity of care.        Taye Haney is a 39 year old, presenting for the following health issues:  Wart (On the bottom of feet close to toes) and Ear Problem (Both ears no drainage itchy and some pain)      4/2/2025     1:51 PM   Additional Questions   Roomed by Crystal   Accompanied by self         4/2/2025     1:51 PM   Patient Reported Additional Medications   Patient reports taking the following new medications none     Ear Problem    History of Present Illness       Reason for visit:  Warts on the bottom of feet and an persistent itchy ear (A few months)  Symptom onset:  More than a month  Symptoms include:  Warts, and itchy/ irritated ear canal  Symptom intensity:  Mild  Symptom progression:   Staying the same  Had these symptoms before:  No   She is taking medications regularly.          Warts  Onset/Duration: 1 month on right foot and 2 weeks on left foot  Description (location/number): 2  Accompanying signs and symptoms (pain, redness): No  History: prior warts: YES  Therapies tried and outcome: none    She has had some small plantar warts in the past which she is treated on her own and they have cleared.  She has a couple of small warts on the bottom of her right foot now and a questionable wart on the bottom of her left foot.  She has tried some over-the-counter freezing treatment, but they have not cleared.  They bother her when she walks on them.  She has also had itchy ear canals.  They seem to be worse when her allergies are acting up.  She has perennial allergies.  She is using a steroid nasal spray and an oral antihistamine, but they are still bothersome.  She wonders what else she could do for them.  She does feel like her mental health is doing well.  She is on medication for that as below.    Patient Active Problem List   Diagnosis    Mild recurrent major depression    EVELIN (generalized anxiety disorder)    Recurrent herpes labialis    Moderate persistent asthma without complication    Non-seasonal allergic rhinitis, unspecified trigger    Kidney stone    Seasonal allergic rhinitis due to pollen     Current Outpatient Medications   Medication Sig Dispense Refill    acyclovir (ZOVIRAX) 400 MG tablet TAKE 1 TABLET BY MOUTH TWICE A  tablet 3    albuterol (PROAIR RESPICLICK) 108 (90 Base) MCG/ACT inhaler Inhale 1-2 puffs into the lungs every 6 hours as needed for shortness of breath or wheezing As PRN 1 each 1    albuterol (PROVENTIL) (2.5 MG/3ML) 0.083% neb solution Take 1 vial (2.5 mg) by nebulization every 6 hours as needed for shortness of breath, wheezing or cough 90 mL 1    budesonide-formoterol (SYMBICORT) 160-4.5 MCG/ACT Inhaler INHALE 1-2 PUFF(S) BY MOUTH TWICE A DAY 10.2 g 5     busPIRone (BUSPAR) 15 MG tablet Take 1 tablet (15 mg) by mouth 2 times daily 180 tablet 3    hydrOXYzine HCl (ATARAX) 25 MG tablet Take 1 tablet (25 mg) by mouth 3 times daily as needed for anxiety. 30 tablet 1    ondansetron (ZOFRAN) 4 MG tablet Take 1 tablet (4 mg) by mouth every 8 hours as needed for nausea. 25 tablet 1    venlafaxine (EFFEXOR XR) 75 MG 24 hr capsule Take 1 capsule (75 mg) by mouth daily. 90 capsule 1     Current Facility-Administered Medications   Medication Dose Route Frequency Provider Last Rate Last Admin    sodium chloride (PF) 0.9% PF flush 3 mL  3 mL Intravenous q1 min prn Juan Acosta PA-C               Review of Systems  Noncontributory except as above.      Objective    /72 (BP Location: Right arm, Patient Position: Sitting, Cuff Size: Adult Regular)   Pulse 68   Temp 100.8  F (38.2  C) (Temporal)   Resp 19   Wt 66.7 kg (147 lb)   LMP 03/30/2025 (Exact Date)   SpO2 99%   BMI 20.74 kg/m    Body mass index is 20.74 kg/m .  Physical Exam   GENERAL: alert and no distress  EYES: Eyes grossly normal to inspection, PERRL and conjunctivae and sclerae normal  HENT: ear canals and TM's normal  SKIN: She has 2 small warts on the lateral distal plantar aspect of her right foot. She has some callus overlying the left fifth metatarsal head on the plantar aspect, but no apparent warts there.  PSYCH: Affect is pleasant and appropriate.  She scored a 2 on her PHQ-9 today.    After discussion of various treatment options with the patient for her plantar warts, they were frozen with liquid nitrogen today.        Signed Electronically by: Rolando Palacios MD

## 2025-05-08 ENCOUNTER — PATIENT OUTREACH (OUTPATIENT)
Dept: CARE COORDINATION | Facility: CLINIC | Age: 40
End: 2025-05-08
Payer: COMMERCIAL

## 2025-06-26 DIAGNOSIS — F33.0 MILD RECURRENT MAJOR DEPRESSION: ICD-10-CM

## 2025-06-26 DIAGNOSIS — F41.1 GAD (GENERALIZED ANXIETY DISORDER): ICD-10-CM

## 2025-06-26 RX ORDER — BUSPIRONE HYDROCHLORIDE 15 MG/1
15 TABLET ORAL 2 TIMES DAILY
Qty: 60 TABLET | Refills: 0 | Status: SHIPPED | OUTPATIENT
Start: 2025-06-26

## 2025-06-26 RX ORDER — VENLAFAXINE HYDROCHLORIDE 75 MG/1
75 CAPSULE, EXTENDED RELEASE ORAL DAILY
Qty: 30 CAPSULE | Refills: 0 | Status: SHIPPED | OUTPATIENT
Start: 2025-06-26

## 2025-06-29 ENCOUNTER — HEALTH MAINTENANCE LETTER (OUTPATIENT)
Age: 40
End: 2025-06-29

## 2025-07-01 DIAGNOSIS — J45.40 MODERATE PERSISTENT ASTHMA WITHOUT COMPLICATION: ICD-10-CM

## 2025-07-01 RX ORDER — BUDESONIDE AND FORMOTEROL FUMARATE DIHYDRATE 160; 4.5 UG/1; UG/1
2 AEROSOL RESPIRATORY (INHALATION)
Qty: 10.2 G | Refills: 5 | Status: SHIPPED | OUTPATIENT
Start: 2025-07-01

## 2025-07-22 DIAGNOSIS — F33.0 MILD RECURRENT MAJOR DEPRESSION: ICD-10-CM

## 2025-07-22 DIAGNOSIS — F41.1 GAD (GENERALIZED ANXIETY DISORDER): ICD-10-CM

## 2025-07-22 RX ORDER — VENLAFAXINE HYDROCHLORIDE 75 MG/1
75 CAPSULE, EXTENDED RELEASE ORAL DAILY
Qty: 30 CAPSULE | Refills: 0 | Status: SHIPPED | OUTPATIENT
Start: 2025-07-22

## 2025-07-22 RX ORDER — BUSPIRONE HYDROCHLORIDE 15 MG/1
15 TABLET ORAL 2 TIMES DAILY
Qty: 60 TABLET | Refills: 0 | Status: SHIPPED | OUTPATIENT
Start: 2025-07-22

## 2025-08-13 ENCOUNTER — ANCILLARY PROCEDURE (OUTPATIENT)
Dept: GENERAL RADIOLOGY | Facility: CLINIC | Age: 40
End: 2025-08-13
Attending: STUDENT IN AN ORGANIZED HEALTH CARE EDUCATION/TRAINING PROGRAM
Payer: COMMERCIAL

## 2025-08-13 ENCOUNTER — OFFICE VISIT (OUTPATIENT)
Dept: URGENT CARE | Facility: URGENT CARE | Age: 40
End: 2025-08-13
Payer: COMMERCIAL

## 2025-08-13 VITALS
HEIGHT: 71 IN | BODY MASS INDEX: 20.3 KG/M2 | TEMPERATURE: 97.8 F | OXYGEN SATURATION: 99 % | WEIGHT: 145 LBS | HEART RATE: 76 BPM | SYSTOLIC BLOOD PRESSURE: 115 MMHG | DIASTOLIC BLOOD PRESSURE: 77 MMHG | RESPIRATION RATE: 16 BRPM

## 2025-08-13 DIAGNOSIS — S63.502A SPRAIN OF LEFT WRIST, INITIAL ENCOUNTER: Primary | ICD-10-CM

## 2025-08-13 DIAGNOSIS — S69.92XA HAND INJURY, LEFT, INITIAL ENCOUNTER: ICD-10-CM

## 2025-08-13 PROCEDURE — 73130 X-RAY EXAM OF HAND: CPT | Mod: TC | Performed by: RADIOLOGY

## 2025-08-13 PROCEDURE — 99214 OFFICE O/P EST MOD 30 MIN: CPT | Performed by: STUDENT IN AN ORGANIZED HEALTH CARE EDUCATION/TRAINING PROGRAM

## 2025-08-13 PROCEDURE — 3078F DIAST BP <80 MM HG: CPT | Performed by: STUDENT IN AN ORGANIZED HEALTH CARE EDUCATION/TRAINING PROGRAM

## 2025-08-13 PROCEDURE — 3074F SYST BP LT 130 MM HG: CPT | Performed by: STUDENT IN AN ORGANIZED HEALTH CARE EDUCATION/TRAINING PROGRAM

## 2025-08-23 DIAGNOSIS — F41.1 GAD (GENERALIZED ANXIETY DISORDER): ICD-10-CM

## 2025-08-23 DIAGNOSIS — F33.0 MILD RECURRENT MAJOR DEPRESSION: ICD-10-CM

## 2025-08-25 RX ORDER — BUSPIRONE HYDROCHLORIDE 15 MG/1
TABLET ORAL
Qty: 60 TABLET | Refills: 0 | Status: SHIPPED | OUTPATIENT
Start: 2025-08-25

## 2025-08-25 RX ORDER — VENLAFAXINE HYDROCHLORIDE 75 MG/1
75 CAPSULE, EXTENDED RELEASE ORAL DAILY
Qty: 30 CAPSULE | Refills: 0 | Status: SHIPPED | OUTPATIENT
Start: 2025-08-25